# Patient Record
Sex: MALE | Race: WHITE | Employment: FULL TIME | URBAN - METROPOLITAN AREA
[De-identification: names, ages, dates, MRNs, and addresses within clinical notes are randomized per-mention and may not be internally consistent; named-entity substitution may affect disease eponyms.]

---

## 2017-01-17 PROBLEM — E11.9 DIABETES MELLITUS WITHOUT COMPLICATION (HCC): Status: ACTIVE | Noted: 2017-01-17

## 2017-01-17 PROBLEM — M10.00 ACUTE IDIOPATHIC GOUT, UNSPECIFIED SITE: Status: ACTIVE | Noted: 2017-01-17

## 2017-03-28 PROCEDURE — 82043 UR ALBUMIN QUANTITATIVE: CPT | Performed by: FAMILY MEDICINE

## 2017-03-28 PROCEDURE — 82570 ASSAY OF URINE CREATININE: CPT | Performed by: FAMILY MEDICINE

## 2017-06-15 ENCOUNTER — APPOINTMENT (OUTPATIENT)
Dept: CT IMAGING | Facility: HOSPITAL | Age: 62
DRG: 871 | End: 2017-06-15
Attending: EMERGENCY MEDICINE
Payer: COMMERCIAL

## 2017-06-15 ENCOUNTER — APPOINTMENT (OUTPATIENT)
Dept: GENERAL RADIOLOGY | Facility: HOSPITAL | Age: 62
DRG: 871 | End: 2017-06-15
Attending: EMERGENCY MEDICINE
Payer: COMMERCIAL

## 2017-06-15 ENCOUNTER — HOSPITAL ENCOUNTER (INPATIENT)
Facility: HOSPITAL | Age: 62
LOS: 6 days | Discharge: HOME | DRG: 871 | End: 2017-06-21
Attending: EMERGENCY MEDICINE | Admitting: HOSPITALIST
Payer: COMMERCIAL

## 2017-06-15 DIAGNOSIS — N12 PYELONEPHRITIS: ICD-10-CM

## 2017-06-15 DIAGNOSIS — N19 RENAL FAILURE, UNSPECIFIED CHRONICITY: Primary | ICD-10-CM

## 2017-06-15 DIAGNOSIS — N17.9 AKI (ACUTE KIDNEY INJURY) (HCC): ICD-10-CM

## 2017-06-15 PROCEDURE — 74176 CT ABD & PELVIS W/O CONTRAST: CPT | Performed by: EMERGENCY MEDICINE

## 2017-06-15 PROCEDURE — 71010 XR CHEST AP PORTABLE  (CPT=71010): CPT | Performed by: EMERGENCY MEDICINE

## 2017-06-15 PROCEDURE — 70450 CT HEAD/BRAIN W/O DYE: CPT | Performed by: EMERGENCY MEDICINE

## 2017-06-15 RX ORDER — ACETAMINOPHEN 500 MG
1000 TABLET ORAL ONCE
Status: COMPLETED | OUTPATIENT
Start: 2017-06-15 | End: 2017-06-15

## 2017-06-15 RX ORDER — ONDANSETRON 2 MG/ML
4 INJECTION INTRAMUSCULAR; INTRAVENOUS EVERY 6 HOURS PRN
Status: DISCONTINUED | OUTPATIENT
Start: 2017-06-15 | End: 2017-06-21

## 2017-06-15 RX ORDER — HEPARIN SODIUM 5000 [USP'U]/ML
5000 INJECTION, SOLUTION INTRAVENOUS; SUBCUTANEOUS EVERY 8 HOURS SCHEDULED
Status: DISCONTINUED | OUTPATIENT
Start: 2017-06-15 | End: 2017-06-16

## 2017-06-15 RX ORDER — SODIUM CHLORIDE 9 MG/ML
1000 INJECTION, SOLUTION INTRAVENOUS ONCE
Status: DISCONTINUED | OUTPATIENT
Start: 2017-06-15 | End: 2017-06-15

## 2017-06-15 RX ORDER — SODIUM CHLORIDE 9 MG/ML
INJECTION, SOLUTION INTRAVENOUS CONTINUOUS
Status: DISCONTINUED | OUTPATIENT
Start: 2017-06-15 | End: 2017-06-15

## 2017-06-15 RX ORDER — ACETAMINOPHEN 325 MG/1
650 TABLET ORAL EVERY 6 HOURS PRN
Status: DISCONTINUED | OUTPATIENT
Start: 2017-06-15 | End: 2017-06-21

## 2017-06-15 RX ORDER — SODIUM CHLORIDE 9 MG/ML
1000 INJECTION, SOLUTION INTRAVENOUS ONCE
Status: COMPLETED | OUTPATIENT
Start: 2017-06-15 | End: 2017-06-15

## 2017-06-15 RX ORDER — DEXTROSE MONOHYDRATE 25 G/50ML
50 INJECTION, SOLUTION INTRAVENOUS
Status: DISCONTINUED | OUTPATIENT
Start: 2017-06-15 | End: 2017-06-21

## 2017-06-15 RX ORDER — SODIUM CHLORIDE 9 MG/ML
INJECTION, SOLUTION INTRAVENOUS CONTINUOUS
Status: ACTIVE | OUTPATIENT
Start: 2017-06-15 | End: 2017-06-16

## 2017-06-15 RX ORDER — SODIUM CHLORIDE 9 MG/ML
INJECTION, SOLUTION INTRAVENOUS CONTINUOUS
Status: DISCONTINUED | OUTPATIENT
Start: 2017-06-15 | End: 2017-06-16

## 2017-06-15 RX ORDER — ARIPIPRAZOLE 15 MG/1
20 TABLET ORAL ONCE
Status: COMPLETED | OUTPATIENT
Start: 2017-06-15 | End: 2017-06-15

## 2017-06-15 RX ORDER — LEVOTHYROXINE SODIUM 0.07 MG/1
75 TABLET ORAL
Status: DISCONTINUED | OUTPATIENT
Start: 2017-06-16 | End: 2017-06-21

## 2017-06-15 NOTE — ED PROVIDER NOTES
Patient Seen in: [facility] Emergency Department    History   Patient presents with:  Altered Mental Status (neurologic)  Headache (neurologic)  Stroke (neurologic)    Stated Complaint: altered mental status, headaches, visual changes    HPI    This and he was going to be discharged home with an outpatient workup. However, when he stood up he was seeing spotty lites and they decided to send him to the emergency room here.   He was last given ibuprofen a couple hours prior to arrival.  He has not taken tablet (1,000 mg total) by mouth daily. Indomethacin ER 75 MG Oral Cap CR,  Take 1 capsule (75 mg total) by mouth 2 (two) times daily with meals.    Glucose Blood (ONETOUCH ULTRA BLUE) In Vitro Strip,  Checking sugar 1 time a day Diagnosis E11.8   ONETOUC Ht 165.1 cm (5' 5\")  Wt 97.977 kg  BMI 35.94 kg/m2  SpO2 98%        Physical Exam    GENERAL: Awake, alert oriented x3, nontoxic appearing. SKIN: Normal, warm, and dry. HEENT:  Pupils equally round and reactive to light. Conjuctiva clear.   Oropharynx i POC Glucose 161 (*)     All other components within normal limits   CBC W/ DIFFERENTIAL - Abnormal; Notable for the following:     HGB 11.5 (*)     HCT 34.8 (*)     .0 (*)     MCV 78.0 (*)     MCH 25.8 (*)     Lymphocyte Absolute 0.43 (*)     All ot Pro-calcitonin was elevated at 37. Lactic acid was normal at 1.2. Blood cultures are sent ×2 and pending. CT scan of the brain was obtained and negative.   CT scan of the abdomen/pelvis was obtained demonstrated a moderate right perinephric inflammatory

## 2017-06-16 PROCEDURE — 99223 1ST HOSP IP/OBS HIGH 75: CPT | Performed by: INTERNAL MEDICINE

## 2017-06-16 RX ORDER — ALFUZOSIN HYDROCHLORIDE 10 MG/1
10 TABLET, EXTENDED RELEASE ORAL
Status: DISCONTINUED | OUTPATIENT
Start: 2017-06-16 | End: 2017-06-21

## 2017-06-16 RX ORDER — ATORVASTATIN CALCIUM 40 MG/1
40 TABLET, FILM COATED ORAL
Status: DISCONTINUED | OUTPATIENT
Start: 2017-06-16 | End: 2017-06-21

## 2017-06-16 RX ORDER — ESCITALOPRAM OXALATE 10 MG/1
10 TABLET ORAL EVERY MORNING
Status: DISCONTINUED | OUTPATIENT
Start: 2017-06-16 | End: 2017-06-21

## 2017-06-16 RX ORDER — ALLOPURINOL 300 MG/1
300 TABLET ORAL
Status: DISCONTINUED | OUTPATIENT
Start: 2017-06-16 | End: 2017-06-21

## 2017-06-16 RX ORDER — MAGNESIUM OXIDE 400 MG (241.3 MG MAGNESIUM) TABLET
400 TABLET ONCE
Status: COMPLETED | OUTPATIENT
Start: 2017-06-16 | End: 2017-06-16

## 2017-06-16 RX ORDER — ACETAMINOPHEN 325 MG/1
650 TABLET ORAL EVERY 6 HOURS PRN
Status: DISCONTINUED | OUTPATIENT
Start: 2017-06-16 | End: 2017-06-16

## 2017-06-16 RX ORDER — PANTOPRAZOLE SODIUM 20 MG/1
20 TABLET, DELAYED RELEASE ORAL
Status: DISCONTINUED | OUTPATIENT
Start: 2017-06-16 | End: 2017-06-21

## 2017-06-16 RX ORDER — DEXTROSE AND SODIUM CHLORIDE 5; .9 G/100ML; G/100ML
INJECTION, SOLUTION INTRAVENOUS CONTINUOUS
Status: DISCONTINUED | OUTPATIENT
Start: 2017-06-16 | End: 2017-06-18

## 2017-06-16 NOTE — CONSULTS
[facility]  Report of Consultation    Alma Romero Patient Status:  Inpatient    1955 MRN [id number]   [facility] 3NE-A Attending Jesika Suggs MD   Hosp Day # 1 PCP Gopi Sutherland MD     Reason for Consultation: failure   • Diabetes Father    • Heart Disease Father      CAD   • Renal Disease Father    • alcoholism[other] [OTHER] Father    • Eva Dire Mother      Stroke   • Stroke Mother 79     CVA   • Stroke Maternal Grandmother      CVA   • Cancer Mate Systems:  See HPI; A total of 12 systems reviewed and otherwise unremarkable. Physical Exam:  Vital signs: Blood pressure 120/64, pulse 88, temperature 100.2 °F (37.9 °C), temperature source Oral, resp.  rate 20, height 65\", weight 217 lb 12.8 oz, SpO2 NITROGEN (mg/dL)   Date Value   03/28/2017 32*   ----------  CREATININE (mg/dL)   Date Value   06/16/2017 3.87*   06/15/2017 3.34*   03/28/2017 0.96   08/24/2016 0.95   ----------  CREATININE (P) (mg/dL)   Date Value   04/04/2013 0.95   10/01/2012 0.91   0

## 2017-06-16 NOTE — PAYOR COMM NOTE
--------------  ADMISSION REVIEW     Payor: [facility]/HMO/POS/EPO    ED  History   Patient presents with:  Altered Mental Status   Headache   Stroke     Stated Complaint: altered mental status, headaches, visual changes      This is a right- 101.7. He presents here for further evaluation. Past Medical History   Diagnosis Date   • HYPERLIPIDEMIA    • HYPERTENSION    • SLEEP APNEA      Uvuloplasty and mask.     • Type II or unspecified type diabetes mellitus without mention of complication, URINALYSIS WITH CULTURE REFLEX - Abnormal; Notable for the following:     Clarity Urine Cloudy (*)     Blood Urine Moderate (*)     Protein Urine 100  (*)     Leukocyte Esterase Urine Large (*)     WBC Urine >50 (*)     RBC URINE 6-10 (*)     Bacteria Ur CULTURE   URINE CULTURE, ROUTINE      EKG    Rate, intervals and axes as noted on EKG Report. Rate:91Rhythm: Sinus Rhythm  Reading: No acute changes. Nonspecific T-wave abnormality.             ED COURSE     Patient placed on cardiac monitor, continuous p

## 2017-06-16 NOTE — H&P
[facility] hospitalist H+P    PCP;An Cat MD  CC fever  HPI 56 yo male with hx of HTN, HL, AMY, DM2, Depression, kidney stone came to ER for evaluation of fever and altered menetal status.  He has not been feeling well, had back pain, per wife   Spouse Name: N/A    Years of Education: N/A  Number of Children: 1     Occupational History  .         Social History Main Topics   Smoking status: Never Smoker     Smokeless tobacco: Never Used    Alcohol Use: No    Drug Use: N tablet Rfl: 3   Glucose Blood (ONETOUCH ULTRA BLUE) In Vitro Strip Checking sugar 1 time a day Diagnosis E11.8 Disp: 100 strip Rfl: 4   ONETOUCH LANCETS Does not apply Misc Checking sugar once a day diagnosis E11.8 Disp: 100 each Rfl: 4   Blood Glucose Mon status. patient with Sepsis    GNR Sepsis(klebsiella Pneumonia infection in blood stream), pyelonephritis: IVF, IV zosyn, awaiting culture sensitivities.  Patient is diabetic, consult ID    FIFI/ARF IVF, appreciate renal input, suspect due to sepsis    Altere

## 2017-06-16 NOTE — PLAN OF CARE
Diabetes/Glucose Control    • Glucose maintained within prescribed range Progressing        GASTROINTESTINAL - ADULT    • Maintains or returns to baseline bowel function Progressing    • Maintains adequate nutritional intake (undernourished) Progressing

## 2017-06-16 NOTE — PROGRESS NOTES
NURSING ADMISSION NOTE      Patient admitted via Cart  Oriented to room. Safety precautions initiated. Bed in low position. Call light in reach. Pt is aox4. States no pain. Up ad alessandra. Contact+ iso d/t recent diarrhea.   Collection device in toilet

## 2017-06-16 NOTE — CONSULTS
INFECTIOUS DISEASE CONSULT NOTE    Oscarashwini Moose Romero Patient Status:  Inpatient    1955 MRN [id number]   [facility] 3NE-A Attending Lowell Flores MD   Hosp Day # 1 PCP Marilynn Oliva scanned to media tab    OTHER SURGICAL HISTORY  10/20/10    Comment RIGHT SHOULDER SUBACROMIAL DECOMPRESSION WITH ROTATOR CUFF REPAIR     Family History   Problem Relation Age of Onset   • Heart Disorder Father    • Hypertension Father      with kidney ming Intravenous, Q12H    Review of Systems:    Completed. See pertinent positives and negatives in the the HPI. Constitutional: see above  Eyes: Negative for eye drainage and redness.   Ears, nose, mouth, throat: Negative for nasal congestion, sore throat, o 1838  06/16/17   0457   GLU  144*  100*   BUN  57*  61*   CREATSERUM  3.34*  3.87*   CA  9.4  9.0   ALB  2.8*   --    NA  135*  139   K  3.7  3.8   CL  103  107   CO2  21.0*  22.0   ALKPHO  61   --    AST  31   --    ALT  38   --    BILT  0.9   --    TP  7 Abdomen+pelvis(cpt=74176)    6/15/2017  PROCEDURE:  CT ABDOMEN+PELVIS(CPT=74176)  COMPARISON:  None.   INDICATIONS:  altered mental status, headaches, visual changes  TECHNIQUE:  Unenhanced multislice CT scanning was performed from the dome of the diaphragm No bony lesion or fracture. LUNG BASES:  Tiny amount of right pleural fluid along with atelectasis is noted. OTHER:  Negative. 6/15/2017  CONCLUSION:   1.  Tiny amount of right pleural fluid with atelectasis

## 2017-06-17 PROCEDURE — 99233 SBSQ HOSP IP/OBS HIGH 50: CPT | Performed by: INTERNAL MEDICINE

## 2017-06-17 NOTE — PROGRESS NOTES
[facility]  Nephrology Progress Note    Audrey Romero Patient Status:  Inpatient    1955 MRN [id number]   [facility] 3NE-A Attending Erasto Bermudez MD   Hosp Day # 2 PCP Charmaine May MD       SUBJECTIVE:  Feels weak, a 06/16/17   1817  06/16/17 2057 06/17/17   0843   PGLU  74  88  148*  126*  146*       Meds:     Current Facility-Administered Medications:  Alfuzosin HCl ER (UROXATRAL) 24 hr tab 10 mg 10 mg Oral Daily   allopurinol (ZYLOPRIM) tab 300 mg 300 mg Oral Marshall Common

## 2017-06-17 NOTE — PLAN OF CARE
GASTROINTESTINAL - ADULT    • Maintains or returns to baseline bowel function Progressing    • Maintains adequate nutritional intake (undernourished) Progressing        PAIN - ADULT    • Verbalizes/displays adequate comfort level or patient's stated pain g

## 2017-06-17 NOTE — PROGRESS NOTES
[facility] hospitalist daily note  Patient was seen/examined on 6/17/17    S; no chest pain, no headache, no abd pain, no SOB    Medications in EPIC    PE;   06/17/17  1145   BP: 148/72   Pulse: 93   Temp: 99.9 °F (37.7 °C)   Resp: 18     Gen: awake, alert, n

## 2017-06-17 NOTE — PROGRESS NOTES
INFECTIOUS DISEASE PROGRESS NOTE    Gloria Romero Patient Status:  Inpatient    1955 MRN [id number]   [facility] 3NE-A Attending Billy Nunez MD   Hosp Day # 2 PCP Amish Rodriguez MD     Subjective:  Improved temp.  Overall f FIFI due to above and poor po intake- no evidence of obstrcution    3. Nephrolithiasis- passed stone recently    4.  TME due to above- better    PLAN:              -d/c Zosyn    -start ceftriaxone              -repeat Bcx to doc clearance - pending

## 2017-06-18 PROCEDURE — 99232 SBSQ HOSP IP/OBS MODERATE 35: CPT | Performed by: INTERNAL MEDICINE

## 2017-06-18 RX ORDER — HYDRALAZINE HYDROCHLORIDE 50 MG/1
25 TABLET, FILM COATED ORAL EVERY 6 HOURS PRN
Status: DISCONTINUED | OUTPATIENT
Start: 2017-06-18 | End: 2017-06-21

## 2017-06-18 RX ORDER — TRAMADOL HYDROCHLORIDE 50 MG/1
50 TABLET ORAL EVERY 12 HOURS PRN
Status: DISCONTINUED | OUTPATIENT
Start: 2017-06-18 | End: 2017-06-20

## 2017-06-18 RX ORDER — HEPARIN SODIUM 5000 [USP'U]/ML
5000 INJECTION, SOLUTION INTRAVENOUS; SUBCUTANEOUS 2 TIMES DAILY
Status: DISCONTINUED | OUTPATIENT
Start: 2017-06-18 | End: 2017-06-21

## 2017-06-18 RX ORDER — FENOFIBRATE 134 MG/1
134 CAPSULE ORAL
Status: DISCONTINUED | OUTPATIENT
Start: 2017-06-18 | End: 2017-06-19

## 2017-06-18 RX ORDER — SODIUM CHLORIDE 9 MG/ML
INJECTION, SOLUTION INTRAVENOUS CONTINUOUS
Status: DISCONTINUED | OUTPATIENT
Start: 2017-06-18 | End: 2017-06-20

## 2017-06-18 NOTE — PROGRESS NOTES
[facility]  Nephrology Progress Note    Sherrie Romero Patient Status:  Inpatient    1955 MRN [id number]   [facility] 3NE-A Attending Andrea Webb MD   Hosp Day # 3 PCP Anabel Sharma MD       SUBJECTIVE:  Feels better 86  223*       Recent Labs   Lab  06/15/17   1838   ALT  38   AST  31   ALB  2.8*       Recent Labs   Lab  06/16/17   2057  06/17/17   0843  06/17/17   1254  06/17/17   1802  06/17/17   2206   PGLU  126*  146*  130*  139*  173*       Meds:     Current Faci

## 2017-06-18 NOTE — PROGRESS NOTES
[facility] hospitalist daily note  Seen/examined on 6/18/17    S; per patient he is still having some headache but overall starts to feel better  Able to move extremities.  No chest pain, no SOB    Medications in EPIC    PE;   06/18/17  1021   BP: 144/70   Pul

## 2017-06-19 ENCOUNTER — APPOINTMENT (OUTPATIENT)
Dept: CV DIAGNOSTICS | Facility: HOSPITAL | Age: 62
DRG: 871 | End: 2017-06-19
Attending: INTERNAL MEDICINE
Payer: COMMERCIAL

## 2017-06-19 PROCEDURE — 99232 SBSQ HOSP IP/OBS MODERATE 35: CPT | Performed by: INTERNAL MEDICINE

## 2017-06-19 PROCEDURE — 93306 TTE W/DOPPLER COMPLETE: CPT | Performed by: INTERNAL MEDICINE

## 2017-06-19 RX ORDER — METOPROLOL SUCCINATE 25 MG/1
25 TABLET, EXTENDED RELEASE ORAL
Status: DISCONTINUED | OUTPATIENT
Start: 2017-06-19 | End: 2017-06-21

## 2017-06-19 RX ORDER — MAGNESIUM OXIDE 400 MG (241.3 MG MAGNESIUM) TABLET
200 TABLET ONCE
Status: COMPLETED | OUTPATIENT
Start: 2017-06-19 | End: 2017-06-19

## 2017-06-19 NOTE — CONSULTS
[facility] [facility] [address] [facility]  Report of Consultation    Johnna Kandice Lunabib Patient Status:  Inpatient    1955 MRN [id number]   [facility] 3NE-A Attending Emelyn Jaramillo,*   Hosp Day # 4 PCP Cornelio José MD     Reason ELECTROCARDIOGRAM, COMPLETE  10/22/13    Comment scanned to media tab    OTHER SURGICAL HISTORY  10/20/10    Comment RIGHT SHOULDER SUBACROMIAL DECOMPRESSION WITH ROTATOR CUFF REPAIR      Family History   Problem Relation Age of Onset   • Heart Disorder Fa tablet Rfl: 3   GLYBURIDE 1.25 MG Oral Tab TAKE 1 TABLET TWICE A DAY BEFORE MEALS Disp: 180 tablet Rfl: 1   FENOFIBRATE 145 MG Oral Tab TAKE 1 TABLET DAILY Disp: 90 tablet Rfl: 3   LEVOTHYROXINE SODIUM 75 MCG Oral Tab TAKE 1 TABLET DAILY Disp: 90 tablet Rf oz (98.204 kg)  05/17/17 : 222 lb 3.2 oz (100.789 kg)          General: Well developed, well nourished male. Pt is in no acute distress. HEENT:   Normocephalic. Atraumatic. Eyes with no scleral icterus. Neck: Supple. No JVD.   Carotids 2+ and equal in of NEDA  3. HL  4.  DM II  5. Hypothyroidism  6. AMY  7. Pylonephritis with septicemia  8. NEDA  9. Abn ECG    Plan:  1. Replete Mg  2. Add Toprol XL 25mg PO qAM  3. CPAP  4. Check TFTs  5.  Echo pending  6. ECG   -F/U QT  7.   Tele monitor    Shin Jurado

## 2017-06-19 NOTE — PROGRESS NOTES
[address] [place] [address]  TEL: [phone]  FAX: [phone] [address] [facility] Patient Status:  Inpatient    1955 MRN [id number]   [facility] 3NE-A Attending Karma Mackay Day # 4 JAN Dow resolution of fevers  Plan will be to transition to po abx once ready for d/c. Will treat for 14 days total  Case and plan d/w pt and family.     Irma Hi

## 2017-06-19 NOTE — PROGRESS NOTES
Pharmacy Note: Renal dose adjustment for Tramadol (Ultram)    Philmore Sandifer has been prescribed Tramadol (Ultram)  50 mg orally every 6 hours as needed for pain. Estimated Creatinine Clearance: 20.4 mL/min (based on Cr of 3.18).     His calculated creati

## 2017-06-19 NOTE — PAYOR COMM NOTE
--------------  CONTINUED STAY REVIEW    Payor: [employer] [facility]/HMO/POS/EPO  Subscriber #:  [id number]  Authorization Number:  [id number]     Admit date: 6/15/2017  6:06 PM       Admitting Physician: Kilo Bolanos MD  Attending Physician:   Liam             -MIKE ARB in setting of NEDA  3.  HL  4.  DM II  5.  Hypothyroidism  6.  AMY  7.  Pylonephritis with septicemia  8.  NEDA  9.  Abn ECG    Plan:  1.  Replete Mg  2.  Add Toprol XL 25mg PO qAM  3.  CPAP  4.  Check TFTs  5.  Echo pending  6.  ECG

## 2017-06-19 NOTE — PROGRESS NOTES
[facility]  Nephrology Progress Note    [address] [facility] Attending:  Makenna Kerns,*       Assessment and Plan:    1) FIFI- due to volume depletion in setting of klebsiella bacteremia / UTI + ARB / NSAID effect -> ATN, now improving with good U PGLU 129 06/19/2017       Imaging: All imaging studies reviewed.     Meds:     Current Facility-Administered Medications:  Metoprolol Succinate ER (Toprol XL) 24 hr tab 25 mg 25 mg Oral Daily Beta Blocker   magnesium oxide (MAG-OX) tab 200 mg 200 mg Oral

## 2017-06-19 NOTE — PROGRESS NOTES
[facility] Hospitalist Progress Note                                                                   [address]  4/27/1955    CC: FU sepsis    Interval History:  - Feels better, energy up a l CREATSERUM  4.23*  3.18*  2.37*   CA  9.0  9.2  9.5   NA  140  141  144   K  4.1  3.8  4.0   CL  110  109  109   CO2  23.0  23.0  28.0           ROS: no change to ROS from my documentation yesterday, except as otherwise noted in the Interval History abov

## 2017-06-20 PROCEDURE — 99232 SBSQ HOSP IP/OBS MODERATE 35: CPT | Performed by: INTERNAL MEDICINE

## 2017-06-20 RX ORDER — TRAMADOL HYDROCHLORIDE 50 MG/1
50 TABLET ORAL EVERY 6 HOURS PRN
Status: DISCONTINUED | OUTPATIENT
Start: 2017-06-20 | End: 2017-06-21

## 2017-06-20 RX ORDER — AMLODIPINE BESYLATE 5 MG/1
10 TABLET ORAL DAILY
Status: DISCONTINUED | OUTPATIENT
Start: 2017-06-20 | End: 2017-06-21

## 2017-06-20 RX ORDER — MAGNESIUM OXIDE 400 MG (241.3 MG MAGNESIUM) TABLET
200 TABLET ONCE
Status: COMPLETED | OUTPATIENT
Start: 2017-06-20 | End: 2017-06-20

## 2017-06-20 NOTE — PROGRESS NOTES
[facility] Hospitalist Progress Note                                                                   [address]  4/27/1955    CC: FU sepsis    Interval History:  - Feeling better, labs improv 31.9  31.8   RDW  15.6  15.7  15.6   NEPRELIM  4.49  5.10  5.11   WBC  5.8  7.3  7.5   PLT  172.0  229.0  276.0     Recent Labs   Lab  06/18/17   0554  06/19/17   0417  06/20/17   0451   GLU  223*  131*  144*   BUN  50*  43*  35*   CREATSERUM  3.18*  2.37*

## 2017-06-20 NOTE — PROGRESS NOTES
[facility]  Progress Note    Davidson Romero Patient Status:  Inpatient    1955 MRN [id number]   [facility] 3NE-A Attending Gilford Guy,*   Hosp Day # 5 PCP Keaton Hines MD     Subjective:   Ksenia Mireles acute distress. HEENT:  Normocephalic. Atraumatic. No icterus. Neck:  There is no jugular venous distention. Cardiovascular:  Cardiovascular examination demonstrates a regular rate and rhythm. There is normal S1, S2. There is no S3 or S4.   There ar motion abnormalities. Features are consistent with a     pseudonormal left ventricular filling pattern, with concomitant abnormal     relaxation and increased filling pressure - grade 2 diastolic     dysfunction. 2. Mitral valve:  There was mild regurgitat

## 2017-06-20 NOTE — CONSULTS
[facility] Pharmacy Note:  Renal Dose Adjustment for Tramadol Elif Villarreal    Vijay Romero was on Tramadol (ULTRAM) 50 mg orally every 12 hours as needed for pain. Estimated Creatinine Clearance: 37.3 mL/min (based on Cr of 1.74).     His calculated creatinine cl

## 2017-06-20 NOTE — PROGRESS NOTES
[facility]  Nephrology Progress Note    [address] [facility] Attending:  Jaxson Brumfield,*       Assessment and Plan:    1) FIFI- due to volume depletion in setting of klebsiella bacteremia / UTI + ARB / NSAID effect -> ATN, now improving with good U 06/20/2017   K 3.7 06/20/2017    06/20/2017   CO2 24.0 06/20/2017    06/20/2017   CA 9.3 06/20/2017   T4F 1.3 06/20/2017   TSH 4.100 06/20/2017   MG 1.5 06/20/2017   PHOS 1.8 06/20/2017   PGLU 149 06/20/2017       Imaging:   All imaging studies

## 2017-06-20 NOTE — PROGRESS NOTES
[address] [facility]  TEL: [phone]  FAX: [phone] 5[address] [facility] Patient Status:  Inpatient    1955 MRN [id number]   [facility] 3NE-A Attending Thompson, [facility] [address] Day # 5 PCP Anh Mackay intake- no evidence of obstruction, cr is better    3. Nephrolithiasis- passed stone recently    4.  TME due to above- resolved    PLAN:  -cont CTX while here  -follow repeat Bcx to doc clearance  -follow temps to doc resolution of fevers  Plan will be to t

## 2017-06-21 VITALS
DIASTOLIC BLOOD PRESSURE: 67 MMHG | HEART RATE: 62 BPM | BODY MASS INDEX: 36.29 KG/M2 | SYSTOLIC BLOOD PRESSURE: 147 MMHG | HEIGHT: 65 IN | OXYGEN SATURATION: 98 % | RESPIRATION RATE: 18 BRPM | WEIGHT: 217.81 LBS | TEMPERATURE: 99 F

## 2017-06-21 PROCEDURE — 99232 SBSQ HOSP IP/OBS MODERATE 35: CPT | Performed by: INTERNAL MEDICINE

## 2017-06-21 RX ORDER — CEFADROXIL 500 MG/1
500 CAPSULE ORAL 2 TIMES DAILY
Qty: 20 CAPSULE | Refills: 0 | Status: SHIPPED | OUTPATIENT
Start: 2017-06-21 | End: 2017-07-01

## 2017-06-21 RX ORDER — CEFADROXIL 500 MG/1
500 CAPSULE ORAL 2 TIMES DAILY
Qty: 20 CAPSULE | Refills: 0 | Status: SHIPPED | OUTPATIENT
Start: 2017-06-21 | End: 2017-06-21

## 2017-06-21 RX ORDER — METOPROLOL SUCCINATE 25 MG/1
25 TABLET, EXTENDED RELEASE ORAL
Qty: 30 TABLET | Refills: 3 | Status: SHIPPED | OUTPATIENT
Start: 2017-06-21 | End: 2017-07-27

## 2017-06-21 RX ORDER — MAGNESIUM OXIDE 400 MG (241.3 MG MAGNESIUM) TABLET
400 TABLET ONCE
Status: COMPLETED | OUTPATIENT
Start: 2017-06-21 | End: 2017-06-21

## 2017-06-21 NOTE — CM/SW NOTE
06/21/17 1100   CM/SW Screening   Referral Source Social Work (self-referral); Nurse   Information Source Chart review;Nursing rounds   Patient's Mental Status Alert;Oriented   Patient lives with Spouse   Patient Status Prior to Admission   Independent w

## 2017-06-21 NOTE — PHYSICAL THERAPY NOTE
PHYSICAL THERAPY QUICK EVALUATION - INPATIENT    Room Number: 2875/2414-O  Evaluation Date: 6/21/2017  Presenting Problem: Sepsis  Physician Order: PT Eval and Treat    Problem List  Principal Problem:    Renal failure, unspecified chronicity  Active Probl RESTRICTION  Weight Bearing Restriction: None                PAIN ASSESSMENT  Ratin         RANGE OF MOTION AND STRENGTH ASSESSMENT  Upper extremity ROM and strength are within functional limits     Lower extremity ROM is within functional limits     L upright on couch. The pt completed sit>stand with arms crossed over chest independently. Pt ambulated 200 feet with independence. Pt ascended/descended 12 stairs with a step-over-step stair pattern with use of a handrail with Mod I.     Patient End of Se

## 2017-06-21 NOTE — DISCHARGE SUMMARY
General Medicine Discharge Summary     Patient ID:  Allyson Heart year old  4/27/1955    Admit date: 6/15/2017    Discharge date and time:  6/21/17    Attending Physician: Andie Ferris deficits  -Resolved    # DM2 SSI- will hold sulfonylurea on DC due to renal function, continue metformin, BS has been OK in house    # HTN, HL no losartan due to ARF. BP is mildly elevated but improved today.  OK for DC- add back ARB as outpatient when OK w discuss with provider.       STOP taking these medications    GLYBURIDE 1.25 MG Oral Tab    FENOFIBRATE 145 MG Oral Tab    LOSARTAN 100 MG Oral Tab    ValACYclovir HCl (VALTREX) 1 G Oral Tab    Indomethacin ER 75 MG Oral Cap CR          Activity: activity a

## 2017-06-21 NOTE — PROGRESS NOTES
[facility]  Nephrology Progress Note    [address] [facility] Attending:  Estrellita Lopez,*       Assessment and Plan:    1) FIFI- due to volume depletion in setting of klebsiella bacteremia / UTI + ARB / NSAID effect -> ATN, now improving with good U 06/21/2017   .0 06/21/2017   CREATSERUM 1.75 06/21/2017   BUN 39 06/21/2017    06/21/2017   K 4.1 06/21/2017    06/21/2017   CO2 27.0 06/21/2017    06/21/2017   CA 9.6 06/21/2017   MG 1.8 06/21/2017   PHOS 2.5 06/21/2017   PGLU 15

## 2017-06-21 NOTE — PROGRESS NOTES
[address] [facility]  TEL: [phone]  FAX: [phone] 5266 [facility] Patient Status:  Inpatient    1955 MRN [id number]   [facility] 3NE-A Attending Lefty Mackay [facility] Day # 6 PCP Tarun Kenney obstruction, cr is better    3. Nephrolithiasis- passed stone recently    4. TME due to above- resolved    PLAN:  -ok for home today on po abx. Will use cefadroxil and treat for 14 days total (10 more days)  Case and plan d/w pt.  He will f/u with his PCP,

## 2017-06-21 NOTE — PROGRESS NOTES
NURSING DISCHARGE NOTE    Discharged Home via Wheelchair. Accompanied by Spouse  Belongings Taken by patient/family.     All discharge instructions reviewed and pt voiced understanding of instructions

## 2017-06-25 NOTE — PAYOR COMM NOTE
--------------  CONTINUED STAY REVIEW    Payor: [address] #:  [id number]  Authorization Number:  [id number]     Admit date: 6/15/2017  6:06 PM       Admitting Physician: Cary Norman MD  Primary Care Physician:

## 2017-06-29 ENCOUNTER — HOSPITAL ENCOUNTER (OUTPATIENT)
Dept: LAB | Facility: HOSPITAL | Age: 62
Discharge: HOME | End: 2017-06-29
Attending: FAMILY MEDICINE
Payer: COMMERCIAL

## 2017-06-29 DIAGNOSIS — E87.5 HYPERKALEMIA: ICD-10-CM

## 2017-06-29 PROCEDURE — 80048 BASIC METABOLIC PNL TOTAL CA: CPT

## 2017-06-29 PROCEDURE — 36415 COLL VENOUS BLD VENIPUNCTURE: CPT

## 2017-07-05 PROCEDURE — 87186 SC STD MICRODIL/AGAR DIL: CPT | Performed by: FAMILY MEDICINE

## 2017-07-05 PROCEDURE — 87077 CULTURE AEROBIC IDENTIFY: CPT | Performed by: FAMILY MEDICINE

## 2017-07-05 PROCEDURE — 87086 URINE CULTURE/COLONY COUNT: CPT | Performed by: FAMILY MEDICINE

## 2017-07-24 PROCEDURE — 87086 URINE CULTURE/COLONY COUNT: CPT | Performed by: FAMILY MEDICINE

## 2017-08-09 PROBLEM — R04.0 EPISTAXIS: Status: ACTIVE | Noted: 2017-08-09

## 2017-12-03 ENCOUNTER — HOSPITAL ENCOUNTER (EMERGENCY)
Facility: HOSPITAL | Age: 62
Discharge: HOME | End: 2017-12-03
Attending: EMERGENCY MEDICINE
Payer: COMMERCIAL

## 2017-12-03 VITALS
HEART RATE: 50 BPM | TEMPERATURE: 98 F | DIASTOLIC BLOOD PRESSURE: 73 MMHG | BODY MASS INDEX: 34.16 KG/M2 | OXYGEN SATURATION: 96 % | RESPIRATION RATE: 18 BRPM | WEIGHT: 205 LBS | SYSTOLIC BLOOD PRESSURE: 160 MMHG | HEIGHT: 65 IN

## 2017-12-03 DIAGNOSIS — H53.9 VISUAL DISTURBANCE: Primary | ICD-10-CM

## 2017-12-03 PROCEDURE — 99283 EMERGENCY DEPT VISIT LOW MDM: CPT

## 2017-12-03 RX ORDER — TETRACAINE HYDROCHLORIDE 5 MG/ML
1-2 SOLUTION OPHTHALMIC ONCE
Status: COMPLETED | OUTPATIENT
Start: 2017-12-03 | End: 2017-12-03

## 2017-12-04 PROBLEM — H25.813 COMBINED FORMS OF AGE-RELATED CATARACT OF BOTH EYES: Status: ACTIVE | Noted: 2017-12-04

## 2017-12-04 PROBLEM — H33.311 RETINAL TEAR OF RIGHT EYE: Status: ACTIVE | Noted: 2017-12-04

## 2017-12-04 PROBLEM — E11.9 DIABETES MELLITUS TYPE 2 WITHOUT RETINOPATHY (HCC): Status: ACTIVE | Noted: 2017-12-04

## 2017-12-04 PROBLEM — H43.811 PVD (POSTERIOR VITREOUS DETACHMENT), RIGHT: Status: ACTIVE | Noted: 2017-12-04

## 2017-12-04 PROBLEM — H43.11 VITREOUS HEMORRHAGE OF RIGHT EYE (HCC): Status: ACTIVE | Noted: 2017-12-04

## 2017-12-04 NOTE — ED PROVIDER NOTES
Patient Seen in: [facility] Emergency Department    History   Patient presents with: Eye Visual Problem (opthalmic)    Stated Complaint: rt eye flashes of light     HPI    26-year-old male complaining of flashing lights in the right eye.   Patient s Review of Systems    Positive for stated complaint: rt eye flashes of light   Other systems are as noted in HPI. Constitutional and vital signs reviewed. All other systems reviewed and negative except as noted above.     Physical Exam   ED Tri

## 2017-12-04 NOTE — ED INITIAL ASSESSMENT (HPI)
Patient reports with visual changes for 3 days. + flashes of light for 2 days, + white ring that he sees in the dark.  + black swirling line in right eye since 6pm today. Denies headache, no hx of the same.

## 2018-06-19 PROCEDURE — 82043 UR ALBUMIN QUANTITATIVE: CPT | Performed by: FAMILY MEDICINE

## 2018-06-19 PROCEDURE — 82570 ASSAY OF URINE CREATININE: CPT | Performed by: FAMILY MEDICINE

## 2018-06-30 PROBLEM — I48.0 PAROXYSMAL ATRIAL FIBRILLATION (HCC): Status: ACTIVE | Noted: 2018-06-30

## 2018-09-10 VITALS — BODY MASS INDEX: 36.65 KG/M2 | HEIGHT: 65 IN | WEIGHT: 220 LBS

## 2018-09-11 ENCOUNTER — APPOINTMENT (OUTPATIENT)
Dept: CV DIAGNOSTICS | Facility: HOSPITAL | Age: 63
End: 2018-09-11
Attending: INTERNAL MEDICINE
Payer: COMMERCIAL

## 2018-09-11 ENCOUNTER — HOSPITAL ENCOUNTER (OUTPATIENT)
Dept: INTERVENTIONAL RADIOLOGY/VASCULAR | Facility: HOSPITAL | Age: 63
Discharge: HOME | End: 2018-09-11
Attending: INTERNAL MEDICINE
Payer: COMMERCIAL

## 2018-09-28 NOTE — IMAGING NOTE
Left a voicemail at patient's home number with instructions to eat breakfast, drink plenty of water, hold caffeine(including decaf and chocolate) x 12 hours, take all medications (including beta blockers), wear comfortable clothes free from metal, arrive a

## 2018-10-02 ENCOUNTER — HOSPITAL ENCOUNTER (OUTPATIENT)
Dept: CT IMAGING | Facility: HOSPITAL | Age: 63
Discharge: HOME | End: 2018-10-02
Attending: INTERNAL MEDICINE
Payer: COMMERCIAL

## 2019-03-04 ENCOUNTER — HOSPITAL ENCOUNTER (EMERGENCY)
Facility: HOSPITAL | Age: 64
Discharge: HOME | End: 2019-03-04
Attending: EMERGENCY MEDICINE
Payer: COMMERCIAL

## 2019-03-04 VITALS
HEART RATE: 56 BPM | WEIGHT: 215 LBS | HEIGHT: 65 IN | DIASTOLIC BLOOD PRESSURE: 81 MMHG | RESPIRATION RATE: 18 BRPM | SYSTOLIC BLOOD PRESSURE: 171 MMHG | OXYGEN SATURATION: 98 % | TEMPERATURE: 97 F | BODY MASS INDEX: 35.82 KG/M2

## 2019-03-04 DIAGNOSIS — H53.9 VISION CHANGES: Primary | ICD-10-CM

## 2019-03-04 PROCEDURE — 99283 EMERGENCY DEPT VISIT LOW MDM: CPT

## 2019-03-04 PROCEDURE — 99282 EMERGENCY DEPT VISIT SF MDM: CPT

## 2019-03-05 NOTE — ED PROVIDER NOTES
Patient Seen in: [facility] Emergency Department    History   Patient presents with: Eye Visual Problem (opthalmic)    Stated Complaint: 30min onset of wavy/floater right eye    HPI    Patient presents with vision change.   The patient states that a Laterality Date   • ELECTROCARDIOGRAM, COMPLETE  10/22/13    scanned to media tab   • OTHER SURGICAL HISTORY  10/20/10    RIGHT SHOULDER SUBACROMIAL DECOMPRESSION WITH ROTATOR CUFF REPAIR   • REMOVAL GALLBLADDER     • TONSILLECTOMY             Social Histo patient is comfortable with the plan.     Disposition and Plan     Clinical Impression:  Vision changes  (primary encounter diagnosis)    Disposition:  Discharge  3/4/2019  8:00 pm    Follow-up:  Jane Dobson MD  [address] [place] [address]

## 2019-03-05 NOTE — ED INITIAL ASSESSMENT (HPI)
Patient reports symptoms to right eye since 530 pm today, feels similar to when he had torn retina 1 year ago. Reports looks like \"wave of blood/black moving across eye\", started on right side and moving across to the front of his visual field.  Admits to

## 2019-04-11 PROBLEM — I48.0 PAROXYSMAL A-FIB (HCC): Status: ACTIVE | Noted: 2018-06-30

## 2019-04-11 PROCEDURE — 86003 ALLG SPEC IGE CRUDE XTRC EA: CPT | Performed by: FAMILY MEDICINE

## 2019-07-22 PROBLEM — N19 RENAL FAILURE: Status: RESOLVED | Noted: 2017-06-15 | Resolved: 2019-07-22

## 2019-07-22 PROBLEM — N19 RENAL FAILURE, UNSPECIFIED CHRONICITY: Status: RESOLVED | Noted: 2017-06-15 | Resolved: 2019-07-22

## 2020-01-09 NOTE — ED INITIAL ASSESSMENT (HPI)
PT TO ER C/O INTERMITTENT CONFUSION, FATIGUE, FEVER, AND HEADACHES SINCE Tuesday. TODAY PT HAD VISUAL CHANGES AT HIS PCP OFFICE. TAMX 101.7. normal...

## 2020-01-21 PROBLEM — M48.061 FORAMINAL STENOSIS OF LUMBAR REGION: Status: ACTIVE | Noted: 2020-01-21

## 2020-01-21 PROBLEM — M54.16 RIGHT LUMBAR RADICULOPATHY: Status: ACTIVE | Noted: 2020-01-21

## 2020-01-29 PROBLEM — G47.33 OBSTRUCTIVE SLEEP APNEA: Status: ACTIVE | Noted: 2020-01-29

## 2021-08-03 PROBLEM — E11.9 DIABETES MELLITUS WITHOUT COMPLICATION (HCC): Status: RESOLVED | Noted: 2017-01-17 | Resolved: 2021-08-03

## 2022-04-04 PROBLEM — E66.01 OBESITY, MORBID (HCC): Status: ACTIVE | Noted: 2022-04-04

## 2023-12-26 ENCOUNTER — HOSPITAL ENCOUNTER (EMERGENCY)
Age: 68
Discharge: HOME | End: 2023-12-26
Payer: MEDICARE

## 2023-12-26 VITALS
WEIGHT: 215 LBS | RESPIRATION RATE: 18 BRPM | OXYGEN SATURATION: 97 % | HEIGHT: 65 IN | HEART RATE: 66 BPM | SYSTOLIC BLOOD PRESSURE: 155 MMHG | TEMPERATURE: 98 F | DIASTOLIC BLOOD PRESSURE: 80 MMHG | BODY MASS INDEX: 35.82 KG/M2

## 2023-12-26 DIAGNOSIS — R04.0 EPISTAXIS: Primary | ICD-10-CM

## 2023-12-26 LAB
APTT PPP: 28.7 SECONDS (ref 23.3–35.6)
BASOPHILS # BLD AUTO: 0.07 X10(3) UL (ref 0–0.2)
BASOPHILS NFR BLD AUTO: 0.9 %
EOSINOPHIL # BLD AUTO: 0.26 X10(3) UL (ref 0–0.7)
EOSINOPHIL NFR BLD AUTO: 3.4 %
ERYTHROCYTE [DISTWIDTH] IN BLOOD BY AUTOMATED COUNT: 14.6 %
HCT VFR BLD AUTO: 35.7 %
HGB BLD-MCNC: 11.8 G/DL
IMM GRANULOCYTES # BLD AUTO: 0.03 X10(3) UL (ref 0–1)
IMM GRANULOCYTES NFR BLD: 0.4 %
INR BLD: 0.95 (ref 0.8–1.2)
LYMPHOCYTES # BLD AUTO: 2.14 X10(3) UL (ref 1–4)
LYMPHOCYTES NFR BLD AUTO: 28 %
MCH RBC QN AUTO: 27.4 PG (ref 26–34)
MCHC RBC AUTO-ENTMCNC: 33.1 G/DL (ref 31–37)
MCV RBC AUTO: 83 FL
MONOCYTES # BLD AUTO: 0.61 X10(3) UL (ref 0.1–1)
MONOCYTES NFR BLD AUTO: 8 %
NEUTROPHILS # BLD AUTO: 4.54 X10 (3) UL (ref 1.5–7.7)
NEUTROPHILS # BLD AUTO: 4.54 X10(3) UL (ref 1.5–7.7)
NEUTROPHILS NFR BLD AUTO: 59.3 %
PLATELET # BLD AUTO: 225 10(3)UL (ref 150–450)
PROTHROMBIN TIME: 12.7 SECONDS (ref 11.6–14.8)
RBC # BLD AUTO: 4.3 X10(6)UL
WBC # BLD AUTO: 7.7 X10(3) UL (ref 4–11)

## 2023-12-26 PROCEDURE — 30905 CONTROL OF NOSEBLEED: CPT

## 2023-12-26 PROCEDURE — 99284 EMERGENCY DEPT VISIT MOD MDM: CPT

## 2023-12-26 PROCEDURE — 85610 PROTHROMBIN TIME: CPT | Performed by: NURSE PRACTITIONER

## 2023-12-26 PROCEDURE — 36415 COLL VENOUS BLD VENIPUNCTURE: CPT

## 2023-12-26 PROCEDURE — 85025 COMPLETE CBC W/AUTO DIFF WBC: CPT | Performed by: NURSE PRACTITIONER

## 2023-12-26 PROCEDURE — 85730 THROMBOPLASTIN TIME PARTIAL: CPT | Performed by: NURSE PRACTITIONER

## 2023-12-26 RX ORDER — ORAL SEMAGLUTIDE 14 MG/1
14 TABLET ORAL DAILY
COMMUNITY

## 2023-12-26 RX ORDER — HYDROCHLOROTHIAZIDE 12.5 MG/1
12.5 CAPSULE, GELATIN COATED ORAL DAILY
COMMUNITY

## 2023-12-26 RX ORDER — AMOXICILLIN 500 MG/1
500 TABLET, FILM COATED ORAL 3 TIMES DAILY
Qty: 15 TABLET | Refills: 0 | Status: SHIPPED | OUTPATIENT
Start: 2023-12-26 | End: 2023-12-31

## 2023-12-27 NOTE — ED INITIAL ASSESSMENT (HPI)
Patient here with frequent nosebleed to left side of nose. States he had been having it weekly over the past few weeks. Today several episodes. On eliquis. No bleeding at this time. Denies pain, lightheadedness, shortness of breath.

## 2023-12-27 NOTE — DISCHARGE INSTRUCTIONS
Use a humidifier in same room. Follow-up with the ENT for your frequent nosebleeds. Leave the device we placed inside your left nare until seen by the ENT. This device should be removed in about 3 days. Take the antibiotics as prescribed. Use an over-the-counter probiotic daily while on the antibiotics.

## 2024-02-19 RX ORDER — HYDROCODONE BITARTRATE AND ACETAMINOPHEN 5; 325 MG/1; MG/1
1 TABLET ORAL EVERY 6 HOURS PRN
COMMUNITY
End: 2024-02-19

## 2024-02-19 RX ORDER — CYCLOBENZAPRINE HCL 10 MG
10 TABLET ORAL NIGHTLY PRN
COMMUNITY
End: 2024-02-19

## 2024-02-19 RX ORDER — SPIRONOLACTONE 25 MG/1
25 TABLET ORAL DAILY
COMMUNITY

## 2024-02-19 NOTE — PAT NURSING NOTE
PreOp Instructions for renal angiogram           Date of Procedure: 02/20/24. Check in at 2:00 pm     Diet Instructions: Do not eat or drink anything for 8 hours before the procedure. No food or water after 7:00 am     Medications: Medications you are allowed to take can be taken with a sip of water the morning of your procedure. Hold vitamins/supplements and spironlactone     Do not take the following Blood Thinner(s): CONTINUE TO HOLD ELIQUIS      Diabetic Instructions: Metformin needs to be held 24 hours prior to procedure, your last dose should be the morning dose the day before procedure, Do not take morning dose of your diabetic medications    Sleep Apnea: If you have sleep apnea, please bring your mask and tubing     Skin Prep: Shower with antibacterial soap using a clean washcloth, prior to procedure          Driving After Procedure: If sedation is given, you WILL NOT be able to drive home. You will need a responsible adult  to drive you home.     Discharge Teaching: Your nurse will give you specific instructions before discharge, Most people can resume normal activities in 2-3 days, Any questions, please call the physician's office        [employer] parking is available starting at 6 am or park in the [facility] parking garage at [facility]. Check in at the [facility] reception desk. Our  will be there to check you in for your procedure. Please bring your insurance cards and ID with you.

## 2024-02-20 ENCOUNTER — HOSPITAL ENCOUNTER (OUTPATIENT)
Dept: INTERVENTIONAL RADIOLOGY/VASCULAR | Facility: HOSPITAL | Age: 69
Discharge: HOME | End: 2024-02-20
Attending: INTERNAL MEDICINE | Admitting: INTERNAL MEDICINE
Payer: MEDICARE

## 2024-02-20 ENCOUNTER — HOSPITAL ENCOUNTER (OUTPATIENT)
Dept: INTERVENTIONAL RADIOLOGY/VASCULAR | Facility: HOSPITAL | Age: 69
Discharge: HOME | End: 2024-02-20
Attending: INTERNAL MEDICINE
Payer: MEDICARE

## 2024-02-20 VITALS
OXYGEN SATURATION: 99 % | TEMPERATURE: 98 F | BODY MASS INDEX: 34.99 KG/M2 | HEIGHT: 65 IN | SYSTOLIC BLOOD PRESSURE: 154 MMHG | RESPIRATION RATE: 21 BRPM | HEART RATE: 56 BPM | DIASTOLIC BLOOD PRESSURE: 67 MMHG | WEIGHT: 210 LBS

## 2024-02-20 DIAGNOSIS — E78.00 PURE HYPERCHOLESTEROLEMIA: ICD-10-CM

## 2024-02-20 DIAGNOSIS — I70.1 RENAL ARTERY STENOSIS (HCC): ICD-10-CM

## 2024-02-20 LAB — GLUCOSE BLD-MCNC: 131 MG/DL (ref 70–99)

## 2024-02-20 PROCEDURE — 99153 MOD SED SAME PHYS/QHP EA: CPT | Performed by: INTERNAL MEDICINE

## 2024-02-20 PROCEDURE — 36252 INS CATH REN ART 1ST BILAT: CPT | Performed by: INTERNAL MEDICINE

## 2024-02-20 PROCEDURE — 99152 MOD SED SAME PHYS/QHP 5/>YRS: CPT | Performed by: INTERNAL MEDICINE

## 2024-02-20 PROCEDURE — B4001ZZ PLAIN RADIOGRAPHY OF ABDOMINAL AORTA USING LOW OSMOLAR CONTRAST: ICD-10-PCS | Performed by: INTERNAL MEDICINE

## 2024-02-20 PROCEDURE — 82962 GLUCOSE BLOOD TEST: CPT

## 2024-02-20 RX ORDER — IODIXANOL 320 MG/ML
100 INJECTION, SOLUTION INTRAVASCULAR
Status: COMPLETED | OUTPATIENT
Start: 2024-02-20 | End: 2024-02-20

## 2024-02-20 RX ORDER — LIDOCAINE HYDROCHLORIDE 10 MG/ML
INJECTION, SOLUTION EPIDURAL; INFILTRATION; INTRACAUDAL; PERINEURAL
Status: COMPLETED
Start: 2024-02-20 | End: 2024-02-20

## 2024-02-20 RX ORDER — MIDAZOLAM HYDROCHLORIDE 1 MG/ML
INJECTION INTRAMUSCULAR; INTRAVENOUS
Status: COMPLETED
Start: 2024-02-20 | End: 2024-02-20

## 2024-02-20 RX ORDER — SODIUM CHLORIDE 9 MG/ML
INJECTION, SOLUTION INTRAVENOUS CONTINUOUS
Status: DISCONTINUED | OUTPATIENT
Start: 2024-02-20 | End: 2024-02-20

## 2024-02-20 RX ORDER — HEPARIN SODIUM 5000 [USP'U]/ML
INJECTION, SOLUTION INTRAVENOUS; SUBCUTANEOUS
Status: COMPLETED
Start: 2024-02-20 | End: 2024-02-20

## 2024-02-20 RX ADMIN — IODIXANOL 175 ML: 320 INJECTION, SOLUTION INTRAVASCULAR at 16:55:00

## 2024-02-20 NOTE — PROCEDURES
[facility]       John Romero Location: Cath Lab    [facility] [id number] MRN [id number]   Admission Date 2/20/2024 Procedure Date 2/20/2024   Attending Physician Dwayne Isaac MD Procedure Physician Dwayne Isaac MD       PERIPHERAL ANGIOGRAHY REPORT    PREOPERATIVE DIAGNOSIS:  multi-drug resistant HTN, abnormal renal artery doppler suggestive of hemodynamically significant left renal artery stenosis  POSTOPERATIVE DIAGNOSIS:  non-obstructive/hemodynamically insignificant renal artery stenosis.  PROCEDURE PERFORMED:  ultrasound guided right femoral artery access, abdominal aortogram, bilateral selective renal angiography, iFR hemodynamic pressure testing to the bilateral renal arteries     OPERATORS: Dwayne Isaac MD     PROCEDURE:  The patient was brought to the cardiac catheterization lab in the fasting state.  Informed consent was obtained.  Moderate sedation was employed using a total of IV Versed 4mg and IV fentanyl 100mg.  I directly observed the patient from 1612 to 1655, for a total of 43 minutes, and an independent trained observer was present and assisted in the monitoring of the patient's level of consciousness and physiological status, watching the heart rate, blood pressure, oximetry, and rhythm, in addition to total moderation time.      ACCESS/CATHETER PLACEMENT:  The groin was prepped and draped in a sterile manner, and the right groin area was anesthetized with 2% lidocaine area.  The right femoral artery was accessed with a micropuncture needle under ultrasound guidance, and a 6-Romanian 11 cm sheath was placed.  A pigtail catheter was advanced over a J wire to the distal aorta,   angiography with proximal pelvic runoff was performed.  Bilateral   selective renal angiography was performed with a 6F JR4 catheter. Hemodynamic pressure testing (iFR) was performed  to the bilateral renal arteries, mainly to measure pressure difference across the ostial lesions of both renal arteries.  At the  conclusion of the study, selective femoral angiography demonstrated suitable access site for closure device; hemostasis was achieved with a perclose suture.      FINDINGS:      1. PERIPHERAL ANGIOGRAM    Aortogram/pelvic angiography  - aortogram: mild intra-renal aortic plaque without significant aneurysm  - selective renal angiography: right- 50% focal ostial stenosis.  left- 30% ostial stenosis    2. PERIPHERAL INTERVENTION: 5000 units of heparin was administered; the bilateral renal arteries were engaged with a 6F JR4 guide catheter;  A John Omni pressure wire was used to measure pressure difference across both ostial lesions; right- 6mmHg, left- 4mmHg differences were measures compared to the abdominal aorta (catheter pressure).  As such, these were deemed hemodynamically insignificant lesions; no intervention was performed.      MEDICATIONS:  See nursing record.     COMPLICATIONS:  No major complications were observed during this   visit to the catheterization lab.     IMPRESSION:    1.  Peripheral angiography  - aortogram/renal angiography: mild intra-renal aortic plaque without significant aneurysm  - selective renal angiography: right- ostial 50% stenosis, left- ostial 30% stenosis  2. Peripheral intervention: Successful hemodynamic testing to the bilateral ostial renal arteries with minimal pressure drop across both lesions, deemed hemodynamically insignificant.      RECOMMENDATIONS: By both angiographic and hemodynamic criteria, the renal arteries are without significant stenosis; no revascularization indicated/performed.

## 2024-02-20 NOTE — PROGRESS NOTES
Patient received from cath lab  Right groin site soft, no hematoma, dressing C/D/I.  Pulses intact.  Hemodynamics stable. Post-op orders received and implemented. Patient and family educated on flat bedrest,. Will continue to monitor.    1900  Hob elevated at 1800, rt groin remains c/d/I. Tolerating po well Pt ambulated in [place] at 1900,rt groin remains unchanged. IV d.cd and discharge instructions reviewed with pt and support person.   1905 Pt discharged to home via wheelchair in stable condition instructions reviewed w pt and spouse.

## 2024-02-20 NOTE — H&P
Patient seen and examined independently. H and P dated 2/13/24 reviewed. No changes in H and P. Risks and benefits of procedure were discussed with patient. Airway examined.  Patient is ASA class 2 and mallampati class 2. Pt is appropriate for conscious sedation. No history of difficult airway.    The risks, benefits, and alternatives of cardiac catheterization were discussed. The risks included, but were not limited to: bleeding, allergic reaction, infection, stroke, myocardial infarction (heart attack), and death. Benefits of the procedure included: symptomatic improvement, diagnosis of heart disease and prevention of myocardial infarction. Alternatives to the procedure included: not performing cardiac catheterization, treatment with medications only, and observation.        Appropriate candidate for Sedation/Analgesia: Yes  Plan for Sedation reviewed: Yes    Explained Anesthesia options and attendant risks, and have determined patient is an appropriate candidate. Yes  Consent for Sedation obtained: Yes  Patient reevaluated immediately prior to Sedation/Analgesia: Yes

## 2024-02-20 NOTE — DISCHARGE INSTRUCTIONS
Follow up with Dr Isaac 3/19 at 3:40 pm    HOME CARE INSTRUCTIONS FOLLOWING CORONARY ANGIOGRAPHY, PERIPHERAL ANGIOGRAPHY, ANGIOPLASTY (PTCA/PTA) OR INSERTION OF STENT IN THE CORONARY, CAROTID, AND/OR PERIPHERAL ARTERIES   Activity:    DO NOT drive after the procedure. You may resume driving late the following day according to the nurse or physician’s instructions    Plan on resting and relaxing tonight and tomorrow    Resume your normal activity after 48 hours, or as instructed by your physician    Do not lift anything over 10 pounds for the next 24 hours    Avoid sexual activity for the next 24 hours    Avoid drinking alcohol for the next 24 hours    If the groin site was used, avoid repeated stair use and excessive walking for the next 24 hours     What is Normal?    A small lump at the procedure site associated with mild tenderness when touched    The procedure site may be bruised or discolored    There may be a small amount of drainage on the bandage   Special Instructions:    Drink plenty of fluids during the next 24 hours to “flush” the contrast from your system    The bandage is to remain in place for 24 hours    Keep the bandage clean and dry    DO NOT submerge the procedure site for 72 hours (no bath tubs or pools)     After 24 hours, you must remove the bandage    You should shower after removing the bandage, and wash the procedure site gently with soap and water    If you choose to wear a bandage for a few days, make sure it remains clean and dry and that it is changed daily   When you should NOTIFY YOUR PHYSICIAN:    Bleeding can occur at the procedure site - both on the outside of the skin and/or beneath the surface of the skin    Swelling or a large lump at the procedure site can occur, which may be accompanied by moderate to severe pain   If either of the above occurs, lie down flat. Have someone apply pressure to the procedure site with both hands, as instructed by the nurse. Hold pressure for 20  minutes and the bleeding should stop. Notify your physician of the occurrence   If the bleeding does not stop, call 911 and continue to apply pressure    If you experience signs of a fever, temperature >101o, chills, infection (redness, swelling, thick yellow drainage, or a foul odor from the procedure site)    If you notice any numbness, tingling, or loss of feeling to your leg or foot for groin access    If you notice any numbness, tingling, or loss of feeling to your fingers or hand, if wrist access was utilized    ur medications was provided to you at discharge.    Continue the walking program initiated in the hospital and progress your walking as directed. Or, gradually resume your previous aerobic exercise schedule as tolerated.    Please keep your previously scheduled appointment with Dr. Isaac     Resume metformin on 2/23 am, call your pcp re blood sugar concerns.    Restart eliquis tonight

## 2024-02-26 ENCOUNTER — HOSPITAL ENCOUNTER (OUTPATIENT)
Dept: INTERVENTIONAL RADIOLOGY/VASCULAR | Facility: HOSPITAL | Age: 69
Discharge: HOME | End: 2024-02-26
Attending: INTERNAL MEDICINE
Payer: MEDICARE

## 2024-06-04 ENCOUNTER — ORDER TRANSCRIPTION (OUTPATIENT)
Dept: PHYSICAL THERAPY | Facility: HOSPITAL | Age: 69
End: 2024-06-04

## 2024-06-04 DIAGNOSIS — M16.12 PRIMARY OSTEOARTHRITIS OF LEFT HIP: ICD-10-CM

## 2024-06-04 DIAGNOSIS — M16.11 PRIMARY OSTEOARTHRITIS OF RIGHT HIP: Primary | ICD-10-CM

## 2024-06-20 ENCOUNTER — TELEPHONE (OUTPATIENT)
Dept: PHYSICAL THERAPY | Facility: HOSPITAL | Age: 69
End: 2024-06-20

## 2024-06-24 ENCOUNTER — OFFICE VISIT (OUTPATIENT)
Dept: PHYSICAL THERAPY | Age: 69
End: 2024-06-24
Attending: STUDENT IN AN ORGANIZED HEALTH CARE EDUCATION/TRAINING PROGRAM

## 2024-06-24 DIAGNOSIS — M16.11 PRIMARY OSTEOARTHRITIS OF RIGHT HIP: Primary | ICD-10-CM

## 2024-06-24 DIAGNOSIS — M16.12 PRIMARY OSTEOARTHRITIS OF LEFT HIP: ICD-10-CM

## 2024-06-24 PROCEDURE — 97161 PT EVAL LOW COMPLEX 20 MIN: CPT | Performed by: PHYSICAL THERAPIST

## 2024-06-24 PROCEDURE — 97110 THERAPEUTIC EXERCISES: CPT | Performed by: PHYSICAL THERAPIST

## 2024-06-24 NOTE — PROGRESS NOTES
LOWER EXTREMITY EVALUATION:     Diagnosis:   Primary osteoarthritis of right hip (M16.11)  Primary osteoarthritis of left hip (M16.12)      Referring Provider: Aquiles Booth  Date of Evaluation:    6/24/2024    Precautions:  None Next MD visit:   none scheduled  Date of Surgery: n/a     PATIENT SUMMARY   John Romero is a 69 year old male who presents to therapy today with complaints of chronic B hip pain and stiffness over the past few years with insidious onset. Symptoms are gradually getting worse. X-rays + for OA.  Pt describes pain level current 0/10, at best 0/10, at worst 3/10.   Current functional limitations include prolonged standing or walking >5-10', yard work, use of stairs, ability to work.     John describes prior level of function full, active. Pt goals include return to PLOF including increasing standing and walking tolerance to allow working part time.  Past medical history was reviewed with John.     ASSESSMENT  John presents to physical therapy evaluation with primary c/o B hip pain and stiffness. The results of the objective tests and measures show decreased ROM, decreased LE strength, decreased flexibility, soft tissue and joint restrictions, and mild gait deviations.  Functional deficits include but are not limited to prolonged standing or walking >5-10', yard work, use of stairs, working.  Signs and symptoms are consistent with diagnosis of B hip OA. Pt and PT discussed evaluation findings, pathology, POC and HEP.  Pt voiced understanding and performs HEP correctly without reported pain. Skilled Physical Therapy is medically necessary to address the above impairments and reach functional goals.     OBJECTIVE:   Observation: Mild lateral trunk deviations while ambulating, but good pace, equal step lengths  Palpation: Mild tightness/tenderness in B quads, lateral hips and gluts      AROM: (* denotes performed with pain)  Hip   Flexion: R 105; L 105  Abduction: R 35; L 25  ER:  R 50; L 50  IR: R 35; L 20     Accessory motion: Mild restrictions B hips    Flexibility:  Hip Flexor: R Mod restrictions, L Mod restrictions  Hamstrings: R <70 deg with SLR; L <70 deg with SLR  Piriformis: R mild restrictions; L Mild restrictions      Strength/MMT: (* denotes performed with pain)  Hip Knee Foot/Ankle   Flexion: R 4/5; L 4/5  Abduction: R 4-/5; L 4-/5  ER: R 4/5; L 4/5  IR: R 4/5; L 4/5 Flexion: R 5/5; L 5/5  Extension: R 5/5; L 5/5    DF: R 5/5; L 5/5  PF: R 5/5; L 5/5           Gait: pt ambulates on level ground with trendelenburg/waddle  Balance: SLS: R NT sec, L NT sec    Today’s Treatment and Response:   Pt education was provided on exam findings, treatment diagnosis, treatment plan, expectations, and prognosis. Pt was also provided recommendations for activity modifications and modalities as needed [ice/heat].  Patient was instructed in and issued a HEP for: Access Code: [email or web address]  URL: [email or web address]  Date: 06/24/2024  Prepared by: Bk Phan    Exercises  - Supine Hamstring Curl on Swiss Ball  - 1 x daily - 7 x weekly - 2 sets - 10 reps  - Hooklying Hamstring Stretch with Strap  - 1 x daily - 7 x weekly - 2 sets - 30 hold  - Supine Piriformis Stretch with Foot on Ground  - 1 x daily - 7 x weekly - 2 sets - 30 hold  - Supine Hip Adduction Isometric with Ball  - 1 x daily - 7 x weekly - 2 sets - 10 reps  - Hooklying Clamshell with Resistance  - 1 x daily - 7 x weekly - 2 sets - 10 reps  - Supine Lower Trunk Rotation  - 1 x daily - 7 x weekly - 2 sets - 10 reps    Charges: PT Eval Low Complexity,       Total Timed Treatment: 40 min     Total Treatment Time: 20 min       PLAN OF CARE:    Goals: (to be met in 8 visits)  Pt will have improved hip AROM Flex to 110 deg and ABD to 40 deg to be able to don/doff shoes and perform car transfers without difficulty   Pt will improve hip ABD and ER strength to 4+/5 to increase ease with standing and walking   Pt will be able  to squat to  light objects around the house with <2/10 hip pain   Pt will improve functional hip strength to report ability to ascend/descend 1 flight of stairs reciprocally without use of handrail   Pt will demonstrate improved SLS to >30 seconds SAMRA to promote safety and decrease risk of falls on uneven surfaces such as grass and gravel   Pt will be independent and compliant with comprehensive HEP to maintain progress achieved in PT      Frequency / Duration: Patient will be seen for 2 x/week or a total of 8 visits over a 90 day period. Treatment will include: Gait training, Manual Therapy, Neuromuscular Re-education, Therapeutic Activities, Therapeutic Exercise, and Home Exercise Program instruction    Education or treatment limitation: None  Rehab Potential:good    LEFS Score  No data recorded    Patient/Family/Caregiver was advised of these findings, precautions, and treatment options and has agreed to actively participate in planning and for this course of care.    Thank you for your referral. Please co-sign or sign and return this letter via fax as soon as possible to [phone]. If you have any questions, please contact me at Dept: [phone]    Sincerely,  Electronically signed by therapist: Bk Phan, PT  Physician's certification required: Yes  I certify the need for these services furnished under this plan of treatment and while under my care.    X___________________________________________________ Date____________________    Certification From: 6/24/2024  To:9/22/2024

## 2024-06-26 ENCOUNTER — OFFICE VISIT (OUTPATIENT)
Dept: PHYSICAL THERAPY | Age: 69
End: 2024-06-26
Attending: STUDENT IN AN ORGANIZED HEALTH CARE EDUCATION/TRAINING PROGRAM

## 2024-06-26 PROCEDURE — 97110 THERAPEUTIC EXERCISES: CPT | Performed by: PHYSICAL THERAPIST

## 2024-06-26 NOTE — PROGRESS NOTES
Diagnosis:   Primary osteoarthritis of right hip (M16.11)  Primary osteoarthritis of left hip (M16.12)      Referring Provider: Aquiles Booth  Date of Evaluation:    6/24/2024    Precautions:  None Next MD visit:   none scheduled  Date of Surgery: n/a   Insurance Primary/Secondary: [employer] MEDICARE / N/A     # Auth Visits: NA            Subjective: Patient states his hips have been feeling very good since initial visit. No significant pain at this time. Performing HEP as instructed with good tolerance.    Pain: 0/10 B hips      Objective: Normal gait mechanics      Assessment: Patient is progressing well with LE strengthening, balance and ROM activities to B hips. Initiated CKC ex's today and performed without increased hip pain. Mild balance deficits with marching on Airex with occasional HHA. General muscle fatigue post treatment.      Goals:   (to be met in 8 visits)  Pt will have improved hip AROM Flex to 110 deg and ABD to 40 deg to be able to don/doff shoes and perform car transfers without difficulty   Pt will improve hip ABD and ER strength to 4+/5 to increase ease with standing and walking   Pt will be able to squat to  light objects around the house with <2/10 hip pain   Pt will improve functional hip strength to report ability to ascend/descend 1 flight of stairs reciprocally without use of handrail   Pt will demonstrate improved SLS to >30 seconds SAMRA to promote safety and decrease risk of falls on uneven surfaces such as grass and gravel   Pt will be independent and compliant with comprehensive HEP to maintain progress achieved in PT      Plan: Continue to progress with LE strengthening and balance activities.  Date: 6/26/2024  TX#: 2/8 Date:                 TX#: 3/ Date:                 TX#: 4/ Date:                 TX#: 5/ Date:   Tx#: 6/   Ther Ex: 40'  NuStep 8\" Lv 3  Supine SB HS curls, x20  LTR stretch x20  HS stretch with strap, 2x30\" B  Piriformis stretch, 2x30\" B  Hooklying  hip add ball squeeze, x20  Hooklying hip abd with BTB, x20  Bridges, 1x10  Shuttle leg press, 125#, 3x10  Side stepping, no resistance, 2 laps       NMR: 4'  Tandem standing on Airex, 2x60\"  Marching on Airex, 60\"                     HEP:  Access Code: [email or web address]  URL: [email or web address]  Date: 06/26/2024  Prepared by: Bk Phan    Exercises  - Hooklying Hamstring Stretch with Strap  - 1 x daily - 7 x weekly - 2 sets - 30 hold  - Supine Piriformis Stretch with Foot on Ground  - 1 x daily - 7 x weekly - 2 sets - 30 hold  - Supine Hip Adduction Isometric with Ball  - 1 x daily - 7 x weekly - 2 sets - 10 reps  - Hooklying Clamshell with Resistance  - 1 x daily - 7 x weekly - 2 sets - 10 reps  - Supine Lower Trunk Rotation  - 1 x daily - 7 x weekly - 2 sets - 10 reps  - Sidestepping  - 1 x daily - 7 x weekly - 2 sets  - Standing Tandem Balance with Counter Support  - 1 x daily - 7 x weekly - 2 sets - 60 hold  - Supine Hamstring Swiss Ball Curls/Dynamic Mobilization  - 1 x daily - 7 x weekly - 3 sets - 10 reps  Charges: 3 Ther ex        Total Timed Treatment: 44 min  Total Treatment Time: 44 min

## 2024-07-02 ENCOUNTER — OFFICE VISIT (OUTPATIENT)
Dept: PHYSICAL THERAPY | Age: 69
End: 2024-07-02
Attending: STUDENT IN AN ORGANIZED HEALTH CARE EDUCATION/TRAINING PROGRAM
Payer: MEDICARE

## 2024-07-02 PROCEDURE — 97110 THERAPEUTIC EXERCISES: CPT | Performed by: PHYSICAL THERAPIST

## 2024-07-02 NOTE — PROGRESS NOTES
Diagnosis:   Primary osteoarthritis of right hip (M16.11)  Primary osteoarthritis of left hip (M16.12)      Referring Provider: Aquiles Booth  Date of Evaluation:    6/24/2024    Precautions:  None Next MD visit:   none scheduled  Date of Surgery: n/a   Insurance Primary/Secondary: [employer] MEDICARE / N/A     # Auth Visits: NA            Subjective: Patient states his walking tolerance is improving without increasing B hip pain. Able to walk approx 30' at this time.    Pain: 0/10 B hips      Objective: Mild balance deficits in tandem standing on Airex      Assessment: Patient is progressing well with LE strengthening and balance activities with emphasis on increasing hip abd/ext strength. Tolerated well with general fatigue post treatment but no reports of hip pain. Gait remains normal.      Goals:   (to be met in 8 visits)  Pt will have improved hip AROM Flex to 110 deg and ABD to 40 deg to be able to don/doff shoes and perform car transfers without difficulty   Pt will improve hip ABD and ER strength to 4+/5 to increase ease with standing and walking   Pt will be able to squat to  light objects around the house with <2/10 hip pain   Pt will improve functional hip strength to report ability to ascend/descend 1 flight of stairs reciprocally without use of handrail   Pt will demonstrate improved SLS to >30 seconds SAMRA to promote safety and decrease risk of falls on uneven surfaces such as grass and gravel   Pt will be independent and compliant with comprehensive HEP to maintain progress achieved in PT      Plan: Continue to progress with LE strengthening and balance activities.  Date: 6/26/2024  TX#: 2/8 Date:7/2/2024                 TX#: 3/8 Date:                 TX#: 4/ Date:                 TX#: 5/ Date:   Tx#: 6/   Ther Ex: 40'  NuStep 8\" Lv 3  Supine SB HS curls, x20  LTR stretch x20  HS stretch with strap, 2x30\" B  Piriformis stretch, 2x30\" B  Hooklying hip add ball squeeze, x20  Hooklying hip  abd with BTB, x20  Bridges, 1x10  Shuttle leg press, 125#, 3x10  Side stepping, no resistance, 2 laps Ther Ex: 40'  NuStep 8' Lv 3  Supine SB HS curls, x30  LTR stretch x20  HS stretch with strap, 2x30\" B  Piriformis stretch, 2x30\" B  Hooklying hip add ball squeeze, x20  Hooklying hip abd with BTB, x20  Standing hip abd, ext with red cuff on ankles, 2x10 ea B  Shuttle leg press, 125#, 2x20      NMR: 4'  Tandem standing on Airex, 2x60\"  Marching on Airex, 60\" NMR: 5'  Tandem standing on Airex, 2x60\"  Step march on 6\" step, x10 B with min HHA                    HEP:  Access Code: [email or web address]  URL: [email or web address]  Date: 06/26/2024  Prepared by: Bk Phan    Exercises  - Hooklying Hamstring Stretch with Strap  - 1 x daily - 7 x weekly - 2 sets - 30 hold  - Supine Piriformis Stretch with Foot on Ground  - 1 x daily - 7 x weekly - 2 sets - 30 hold  - Supine Hip Adduction Isometric with Ball  - 1 x daily - 7 x weekly - 2 sets - 10 reps  - Hooklying Clamshell with Resistance  - 1 x daily - 7 x weekly - 2 sets - 10 reps  - Supine Lower Trunk Rotation  - 1 x daily - 7 x weekly - 2 sets - 10 reps  - Sidestepping  - 1 x daily - 7 x weekly - 2 sets  - Standing Tandem Balance with Counter Support  - 1 x daily - 7 x weekly - 2 sets - 60 hold  - Supine Hamstring Swiss Ball Curls/Dynamic Mobilization  - 1 x daily - 7 x weekly - 3 sets - 10 reps  Charges: 3 Ther ex        Total Timed Treatment: 45 min  Total Treatment Time: 45 min

## 2024-07-09 ENCOUNTER — OFFICE VISIT (OUTPATIENT)
Dept: PHYSICAL THERAPY | Age: 69
End: 2024-07-09
Attending: STUDENT IN AN ORGANIZED HEALTH CARE EDUCATION/TRAINING PROGRAM
Payer: MEDICARE

## 2024-07-09 PROCEDURE — 97110 THERAPEUTIC EXERCISES: CPT | Performed by: PHYSICAL THERAPIST

## 2024-07-09 NOTE — PROGRESS NOTES
Diagnosis:   Primary osteoarthritis of right hip (M16.11)  Primary osteoarthritis of left hip (M16.12)      Referring Provider: Aquiles Booth  Date of Evaluation:    6/24/2024    Precautions:  None Next MD visit:   none scheduled  Date of Surgery: n/a   Insurance Primary/Secondary: [employer] MEDICARE / N/A     # Auth Visits: NA            Subjective: Patient reports good walking tolerance as long as he takes occasional sitting rests. No significant hip pain at this time. Performing HEP with good tolerance.    Pain: 0/10 B hips      Objective:  Mild joint restrictions B hips into IR/ER      Assessment: Mild joint restrictions persist in B hip ROM, but no significant ERP. Improving LE flexibility. Progressing well with LE strengthening and balance activities without increased hip symptoms. Improved stability during tandem standing and step ups with a march.      Goals:   (to be met in 8 visits)  Pt will have improved hip AROM Flex to 110 deg and ABD to 40 deg to be able to don/doff shoes and perform car transfers without difficulty   Pt will improve hip ABD and ER strength to 4+/5 to increase ease with standing and walking   Pt will be able to squat to  light objects around the house with <2/10 hip pain   Pt will improve functional hip strength to report ability to ascend/descend 1 flight of stairs reciprocally without use of handrail   Pt will demonstrate improved SLS to >30 seconds SAMRA to promote safety and decrease risk of falls on uneven surfaces such as grass and gravel   Pt will be independent and compliant with comprehensive HEP to maintain progress achieved in PT      Plan: Continue to progress with LE strengthening and balance activities to improve tolerance to walking community distances and ascend/descend a flight of stairs.  Date: 6/26/2024  TX#: 2/8 Date:7/2/2024                 TX#: 3/8 Date: 7/9/2024                TX#: 4/8 Date:                 TX#: 5/ Date:   Tx#: 6/ Ther Ex:  40'  NuStep 8\" Lv 3  Supine SB HS curls, x20  LTR stretch x20  HS stretch with strap, 2x30\" B  Piriformis stretch, 2x30\" B  Hooklying hip add ball squeeze, x20  Hooklying hip abd with BTB, x20  Bridges, 1x10  Shuttle leg press, 125#, 3x10  Side stepping, no resistance, 2 laps Ther Ex: 40'  NuStep 8' Lv 3  Supine SB HS curls, x30  LTR stretch x20  HS stretch with strap, 2x30\" B  Piriformis stretch, 2x30\" B  Hooklying hip add ball squeeze, x20  Hooklying hip abd with BTB, x20  Standing hip abd, ext with red cuff on ankles, 2x10 ea B  Shuttle leg press, 125#, 2x20 Ther Ex: 42'  NuStep 8' Lv 3  Supine SB HS curls, x30  LTR stretch x20 B  HS stretch with strap, 2x30\" B  Piriformis stretch, 2x30\" B  Bridges, 2x10  Hooklying hip abd with Gray band, x30  Standing hip abd, ext with red cuff on ankles, 2x10 ea B  Shuttle leg press, 125#, 2x20  Shuttle single leg press, 62# x20 B  TRX squats, 1x20     NMR: 4'  Tandem standing on Airex, 2x60\"  Marching on Airex, 60\" NMR: 5'  Tandem standing on Airex, 2x60\"  Step march on 6\" step, x10 B with min HHA NMR: 5'  Tandem standing on Airex, 2x60\"  Step march on 6\" step, x20 B with min HHA                   HEP:  Access Code: [email or web address]  URL: [email or web address]  Date: 06/26/2024  Prepared by: Bk Phan    Exercises  - Hooklying Hamstring Stretch with Strap  - 1 x daily - 7 x weekly - 2 sets - 30 hold  - Supine Piriformis Stretch with Foot on Ground  - 1 x daily - 7 x weekly - 2 sets - 30 hold  - Supine Hip Adduction Isometric with Ball  - 1 x daily - 7 x weekly - 2 sets - 10 reps  - Hooklying Clamshell with Resistance  - 1 x daily - 7 x weekly - 2 sets - 10 reps  - Supine Lower Trunk Rotation  - 1 x daily - 7 x weekly - 2 sets - 10 reps  - Sidestepping  - 1 x daily - 7 x weekly - 2 sets  - Standing Tandem Balance with Counter Support  - 1 x daily - 7 x weekly - 2 sets - 60 hold  - Supine Hamstring Swiss Ball Curls/Dynamic Mobilization  - 1 x daily - 7 x weekly  - 3 sets - 10 reps  Charges: 3 Ther ex        Total Timed Treatment: 47 min  Total Treatment Time: 47 min

## 2024-07-11 ENCOUNTER — OFFICE VISIT (OUTPATIENT)
Dept: PHYSICAL THERAPY | Age: 69
End: 2024-07-11
Attending: STUDENT IN AN ORGANIZED HEALTH CARE EDUCATION/TRAINING PROGRAM
Payer: MEDICARE

## 2024-07-11 PROCEDURE — 97110 THERAPEUTIC EXERCISES: CPT | Performed by: PHYSICAL THERAPIST

## 2024-07-11 NOTE — PROGRESS NOTES
Diagnosis:   Primary osteoarthritis of right hip (M16.11)  Primary osteoarthritis of left hip (M16.12)      Referring Provider: Aquiles Booth  Date of Evaluation:    6/24/2024    Precautions:  None Next MD visit:   none scheduled  Date of Surgery: n/a   Insurance Primary/Secondary: [employer] MEDICARE / N/A     # Auth Visits: NA            Subjective: Patient states he had a little L knee pain yesterday while using stairs, but symptoms have resolved today. No pain in the hips. Tolerated last treatment well without increased symptoms.    Pain: 0/10 B hips      Objective:  Normal gait      Assessment: Patient demonstrates improved balance during tandem standing on Airex without HHA. Progressing well with LE strengthening with emphasis on hip abd and ext strength. General fatigue post treatment but no pain complaints.      Goals:   (to be met in 8 visits)  Pt will have improved hip AROM Flex to 110 deg and ABD to 40 deg to be able to don/doff shoes and perform car transfers without difficulty   Pt will improve hip ABD and ER strength to 4+/5 to increase ease with standing and walking   Pt will be able to squat to  light objects around the house with <2/10 hip pain   Pt will improve functional hip strength to report ability to ascend/descend 1 flight of stairs reciprocally without use of handrail   Pt will demonstrate improved SLS to >30 seconds SAMRA to promote safety and decrease risk of falls on uneven surfaces such as grass and gravel   Pt will be independent and compliant with comprehensive HEP to maintain progress achieved in PT      Plan: Continue to progress with strengthening and balance activities.  Date: 6/26/2024  TX#: 2/8 Date:7/2/2024                 TX#: 3/8 Date: 7/9/2024                TX#: 4/8 Date:7/11/2024                TX#: 5/8 Date:   Tx#: 6/   Ther Ex: 40'  NuStep 8\" Lv 3  Supine SB HS curls, x20  LTR stretch x20  HS stretch with strap, 2x30\" B  Piriformis stretch, 2x30\"  B  Hooklying hip add ball squeeze, x20  Hooklying hip abd with BTB, x20  Bridges, 1x10  Shuttle leg press, 125#, 3x10  Side stepping, no resistance, 2 laps Ther Ex: 40'  NuStep 8' Lv 3  Supine SB HS curls, x30  LTR stretch x20  HS stretch with strap, 2x30\" B  Piriformis stretch, 2x30\" B  Hooklying hip add ball squeeze, x20  Hooklying hip abd with BTB, x20  Standing hip abd, ext with red cuff on ankles, 2x10 ea B  Shuttle leg press, 125#, 2x20 Ther Ex: 42'  NuStep 8' Lv 3  Supine SB HS curls, x30  LTR stretch x20 B  HS stretch with strap, 2x30\" B  Piriformis stretch, 2x30\" B  Bridges, 2x10  Hooklying hip abd with Gray band, x30  Standing hip abd, ext with red cuff on ankles, 2x10 ea B  Shuttle leg press, 125#, 2x20  Shuttle single leg press, 62# x20 B  TRX squats, 1x20 Ther Ex: 40'  NuStep 5' Lv 3  Supine SB HS curls, x30  LTR stretch x20 B  HS stretch with strap, 2x30\" B  Piriformis stretch, 2x30\" B  SB Bridges, 2x10  S/L clams 2x10 B  Shuttle leg press, 125#, 3x15  Shuttle single leg press, 62# x20 B  TRX squats, 1x20  Side stepping, 2 laps, R tubing at ankles    NMR: 4'  Tandem standing on Airex, 2x60\"  Marching on Airex, 60\" NMR: 5'  Tandem standing on Airex, 2x60\"  Step march on 6\" step, x10 B with min HHA NMR: 5'  Tandem standing on Airex, 2x60\"  Step march on 6\" step, x20 B with min HHA NMR: 2'  Tandem standing on Airex, 2x60\"                    HEP:  Access Code: [email or web address]  URL: [email or web address]  Date: 06/26/2024  Prepared by: Bk Phan    Exercises  - Hooklying Hamstring Stretch with Strap  - 1 x daily - 7 x weekly - 2 sets - 30 hold  - Supine Piriformis Stretch with Foot on Ground  - 1 x daily - 7 x weekly - 2 sets - 30 hold  - Supine Hip Adduction Isometric with Ball  - 1 x daily - 7 x weekly - 2 sets - 10 reps  - Hooklying Clamshell with Resistance  - 1 x daily - 7 x weekly - 2 sets - 10 reps  - Supine Lower Trunk Rotation  - 1 x daily - 7 x weekly - 2 sets - 10 reps  -  Sidestepping  - 1 x daily - 7 x weekly - 2 sets  - Standing Tandem Balance with Counter Support  - 1 x daily - 7 x weekly - 2 sets - 60 hold  - Supine Hamstring Swiss Ball Curls/Dynamic Mobilization  - 1 x daily - 7 x weekly - 3 sets - 10 reps  Charges: 3 Ther ex        Total Timed Treatment: 42 min  Total Treatment Time: 42 min

## 2024-07-16 ENCOUNTER — APPOINTMENT (OUTPATIENT)
Dept: PHYSICAL THERAPY | Age: 69
End: 2024-07-16
Attending: STUDENT IN AN ORGANIZED HEALTH CARE EDUCATION/TRAINING PROGRAM
Payer: MEDICARE

## 2024-07-18 ENCOUNTER — OFFICE VISIT (OUTPATIENT)
Dept: PHYSICAL THERAPY | Age: 69
End: 2024-07-18
Attending: STUDENT IN AN ORGANIZED HEALTH CARE EDUCATION/TRAINING PROGRAM
Payer: MEDICARE

## 2024-07-18 PROCEDURE — 97110 THERAPEUTIC EXERCISES: CPT | Performed by: PHYSICAL THERAPIST

## 2024-07-18 NOTE — PROGRESS NOTES
Diagnosis:   Primary osteoarthritis of right hip (M16.11)  Primary osteoarthritis of left hip (M16.12)      Referring Provider: Aquiles Booth  Date of Evaluation:    6/24/2024    Precautions:  None Next MD visit:   none scheduled  Date of Surgery: n/a   Insurance Primary/Secondary: [employer] MEDICARE / N/A     # Auth Visits: NA            Subjective: Patient states he has had increased B knee pain and LE tightness over the past few days that has limited activity levels, but denies any significant B hip pain.    Pain: 0/10 B hips, 2/10 L knee      Objective:  LE flexibility deficits in B HS and calves      Assessment: Patient was able to tolerate LE stretching, strengthening, and balance activities well without knee or hip pain. Demonstrates improving balance, able to hold 15\" B without HA. Normal gait mechanics.      Goals:   (to be met in 8 visits)  Pt will have improved hip AROM Flex to 110 deg and ABD to 40 deg to be able to don/doff shoes and perform car transfers without difficulty   Pt will improve hip ABD and ER strength to 4+/5 to increase ease with standing and walking   Pt will be able to squat to  light objects around the house with <2/10 hip pain   Pt will improve functional hip strength to report ability to ascend/descend 1 flight of stairs reciprocally without use of handrail   Pt will demonstrate improved SLS to >30 seconds SAMRA to promote safety and decrease risk of falls on uneven surfaces such as grass and gravel   Pt will be independent and compliant with comprehensive HEP to maintain progress achieved in PT      Plan: Continue therapy per POC to increase strength and balance 2 additional visits, then plan to DC to HEP.  Date: 6/26/2024  TX#: 2/8 Date:7/2/2024                 TX#: 3/8 Date: 7/9/2024                TX#: 4/8 Date:7/11/2024                TX#: 5/8 Date:7/18/2024   Tx#: 6/8   Ther Ex: 40'  NuStep 8\" Lv 3  Supine SB HS curls, x20  LTR stretch x20  HS stretch with strap,  2x30\" B  Piriformis stretch, 2x30\" B  Hooklying hip add ball squeeze, x20  Hooklying hip abd with BTB, x20  Bridges, 1x10  Shuttle leg press, 125#, 3x10  Side stepping, no resistance, 2 laps Ther Ex: 40'  NuStep 8' Lv 3  Supine SB HS curls, x30  LTR stretch x20  HS stretch with strap, 2x30\" B  Piriformis stretch, 2x30\" B  Hooklying hip add ball squeeze, x20  Hooklying hip abd with BTB, x20  Standing hip abd, ext with red cuff on ankles, 2x10 ea B  Shuttle leg press, 125#, 2x20 Ther Ex: 42'  NuStep 8' Lv 3  Supine SB HS curls, x30  LTR stretch x20 B  HS stretch with strap, 2x30\" B  Piriformis stretch, 2x30\" B  Bridges, 2x10  Hooklying hip abd with Gray band, x30  Standing hip abd, ext with red cuff on ankles, 2x10 ea B  Shuttle leg press, 125#, 2x20  Shuttle single leg press, 62# x20 B  TRX squats, 1x20 Ther Ex: 40'  NuStep 5' Lv 3  Supine SB HS curls, x30  LTR stretch x20 B  HS stretch with strap, 2x30\" B  Piriformis stretch, 2x30\" B  SB Bridges, 2x10  S/L clams 2x10 B  Shuttle leg press, 125#, 3x15  Shuttle single leg press, 62# x20 B  TRX squats, 1x20  Side stepping, 2 laps, R tubing at ankles Ther Ex: 42'  NuStep 7' Lv 3  Supine SB HS curls, x30  LTR stretch x20 B  HS stretch with strap, 2x30\" B  Piriformis stretch, 2x30\" B  Standing calf stretch on 1/2 foam, 2x30\"B  Hooklying hip abd, Gray band, 3x10  S/L clams 2x10 B  Shuttle leg press, 125#, 3x15  Shuttle single leg press, 62# x30 B  TRX squats, 1x20  Standing hip abd, ext, YTB on ankles, 2x10 ea B     NMR: 4'  Tandem standing on Airex, 2x60\"  Marching on Airex, 60\" NMR: 5'  Tandem standing on Airex, 2x60\"  Step march on 6\" step, x10 B with min HHA NMR: 5'  Tandem standing on Airex, 2x60\"  Step march on 6\" step, x20 B with min HHA NMR: 2'  Tandem standing on Airex, 2x60\"   NMR: 4'  SLS balance, 3x15\"  Slow march on Airex, 2x60\"                 HEP:  Access Code: [email or web address]  URL: [email or web address]  Date: 07/18/2024  Prepared by: Bk  Gervase    Exercises  - Hooklying Hamstring Stretch with Strap  - 1 x daily - 7 x weekly - 2 sets - 30 hold  - Supine Piriformis Stretch with Foot on Ground  - 1 x daily - 7 x weekly - 2 sets - 30 hold  - Supine Hip Adduction Isometric with Ball  - 1 x daily - 7 x weekly - 2 sets - 10 reps  - Hooklying Clamshell with Resistance  - 1 x daily - 7 x weekly - 2 sets - 10 reps  - Supine Lower Trunk Rotation  - 1 x daily - 7 x weekly - 2 sets - 10 reps  - Sidestepping  - 1 x daily - 7 x weekly - 2 sets  - Standing Tandem Balance with Counter Support  - 1 x daily - 7 x weekly - 2 sets - 60 hold  - Supine Hamstring Swiss Ball Curls/Dynamic Mobilization  - 1 x daily - 7 x weekly - 3 sets - 10 reps  - Gastroc Stretch on Step  - 1 x daily - 7 x weekly - 2 sets - 30 hold  - Standing March with Counter Support  - 1 x daily - 7 x weekly - 2 sets - 10 reps  - Supine Bridge  - 1 x daily - 7 x weekly - 2 sets - 10 reps    Charges: 3 Ther ex        Total Timed Treatment: 46 min  Total Treatment Time: 46 min

## 2024-07-22 ENCOUNTER — TELEPHONE (OUTPATIENT)
Dept: PHYSICAL THERAPY | Age: 69
End: 2024-07-22

## 2024-07-24 ENCOUNTER — TELEPHONE (OUTPATIENT)
Dept: PHYSICAL THERAPY | Age: 69
End: 2024-07-24

## 2024-08-08 ENCOUNTER — OFFICE VISIT (OUTPATIENT)
Dept: PHYSICAL THERAPY | Age: 69
End: 2024-08-08
Attending: STUDENT IN AN ORGANIZED HEALTH CARE EDUCATION/TRAINING PROGRAM
Payer: MEDICARE

## 2024-08-08 PROCEDURE — 97110 THERAPEUTIC EXERCISES: CPT | Performed by: PHYSICAL THERAPIST

## 2024-08-08 NOTE — PROGRESS NOTES
Diagnosis:   Primary osteoarthritis of right hip (M16.11)  Primary osteoarthritis of left hip (M16.12)      Referring Provider: CHI ALY  Date of Evaluation:    6/24/2024    Precautions:  None Next MD visit:   none scheduled  Date of Surgery: n/a   Insurance Primary/Secondary: [employer] MEDICARE / N/A     # Auth Visits: NA            Subjective: Patient states he developed significant LBP 2-3 weeks ago after lifting a heavy case of water. Limiting activity levels to allow pain levels to improve. Feeling pretty good at this time. Returning to performing HEP with good tolerance.    Pain: 0/10 B hips, 1-2/10 L/S      Objective:  Mild forward flexed posture while ambulating otherwise normal gait      Assessment: Patient demonstrates improving LE flexibility, and LE and core strength continues to improve. Tolerated PREs well today without increased symptoms in B hips or low back.      Goals:   (to be met in 8 visits)  Pt will have improved hip AROM Flex to 110 deg and ABD to 40 deg to be able to don/doff shoes and perform car transfers without difficulty   Pt will improve hip ABD and ER strength to 4+/5 to increase ease with standing and walking   Pt will be able to squat to  light objects around the house with <2/10 hip pain   Pt will improve functional hip strength to report ability to ascend/descend 1 flight of stairs reciprocally without use of handrail   Pt will demonstrate improved SLS to >30 seconds SAMRA to promote safety and decrease risk of falls on uneven surfaces such as grass and gravel   Pt will be independent and compliant with comprehensive HEP to maintain progress achieved in PT      Plan: Continue one additional visit to progress with LE strengthening and balance activities then plan to DC to HEP.  Date:7/2/2024                 TX#: 3/8 Date: 7/9/2024                TX#: 4/8 Date:7/11/2024                TX#: 5/8 Date:7/18/2024   Tx#: 6/8 Date:8/8/2024   Tx#: 7/8   Ther Ex:  40'  NuStep 8' Lv 3  Supine SB HS curls, x30  LTR stretch x20  HS stretch with strap, 2x30\" B  Piriformis stretch, 2x30\" B  Hooklying hip add ball squeeze, x20  Hooklying hip abd with BTB, x20  Standing hip abd, ext with red cuff on ankles, 2x10 ea B  Shuttle leg press, 125#, 2x20 Ther Ex: 42'  NuStep 8' Lv 3  Supine SB HS curls, x30  LTR stretch x20 B  HS stretch with strap, 2x30\" B  Piriformis stretch, 2x30\" B  Bridges, 2x10  Hooklying hip abd with Gray band, x30  Standing hip abd, ext with red cuff on ankles, 2x10 ea B  Shuttle leg press, 125#, 2x20  Shuttle single leg press, 62# x20 B  TRX squats, 1x20 Ther Ex: 40'  NuStep 5' Lv 3  Supine SB HS curls, x30  LTR stretch x20 B  HS stretch with strap, 2x30\" B  Piriformis stretch, 2x30\" B  SB Bridges, 2x10  S/L clams 2x10 B  Shuttle leg press, 125#, 3x15  Shuttle single leg press, 62# x20 B  TRX squats, 1x20  Side stepping, 2 laps, R tubing at ankles Ther Ex: 42' NuStep 7' Lv 3  Supine SB HS curls, x30  LTR stretch x20 B  HS stretch with strap, 2x30\" B  Piriformis stretch, 2x30\" B  Standing calf stretch on 1/2 foam, 2x30\"B  Hooklying hip abd, Gray band, 3x10  S/L clams 2x10 B  Shuttle leg press, 125#, 3x15  Shuttle single leg press, 62# x30 B  TRX squats, 1x20  Standing hip abd, ext, YTB on ankles, 2x10 ea B   Ther Ex: 42' NuStep 7' Lv 3  Supine SB HS curls, x30  LTR stretch x20 B  HS stretch with strap, 2x30\" B  Piriformis stretch, 2x30\" B  Supine march x20  Standing calf stretch on 1/2 foam, 2x30\"B  Hooklying hip abd, Gray band, 3x10  S/L clams 2x10 B  Shuttle leg press, 125#, 2x20  Shuttle single leg press, 62# x25 B  Standing hip abd, ext, YTB on ankles, 2x10 ea B   NMR: 5'  Tandem standing on Airex, 2x60\"  Step march on 6\" step, x10 B with min HHA NMR: 5'  Tandem standing on Airex, 2x60\"  Step march on 6\" step, x20 B with min HHA NMR: 2'  Tandem standing on Airex, 2x60\"   NMR: 4'  SLS balance, 3x15\"  Slow march on Airex, 2x60\" NMR: 3'  SLS balance, 3x15\" on  Donna                   HEP:  Access Code: [email or web address]  URL: [email or web address]  Date: 07/18/2024  Prepared by: Bk Phan    Exercises  - Hooklying Hamstring Stretch with Strap  - 1 x daily - 7 x weekly - 2 sets - 30 hold  - Supine Piriformis Stretch with Foot on Ground  - 1 x daily - 7 x weekly - 2 sets - 30 hold  - Supine Hip Adduction Isometric with Ball  - 1 x daily - 7 x weekly - 2 sets - 10 reps  - Hooklying Clamshell with Resistance  - 1 x daily - 7 x weekly - 2 sets - 10 reps  - Supine Lower Trunk Rotation  - 1 x daily - 7 x weekly - 2 sets - 10 reps  - Sidestepping  - 1 x daily - 7 x weekly - 2 sets  - Standing Tandem Balance with Counter Support  - 1 x daily - 7 x weekly - 2 sets - 60 hold  - Supine Hamstring Swiss Ball Curls/Dynamic Mobilization  - 1 x daily - 7 x weekly - 3 sets - 10 reps  - Gastroc Stretch on Step  - 1 x daily - 7 x weekly - 2 sets - 30 hold  - Standing March with Counter Support  - 1 x daily - 7 x weekly - 2 sets - 10 reps  - Supine Bridge  - 1 x daily - 7 x weekly - 2 sets - 10 reps    Charges: 3 Ther ex        Total Timed Treatment: 45 min  Total Treatment Time: 45 min

## 2024-08-14 ENCOUNTER — APPOINTMENT (OUTPATIENT)
Dept: PHYSICAL THERAPY | Age: 69
End: 2024-08-14
Attending: STUDENT IN AN ORGANIZED HEALTH CARE EDUCATION/TRAINING PROGRAM
Payer: MEDICARE

## 2024-08-20 ENCOUNTER — TELEPHONE (OUTPATIENT)
Dept: ORTHOPEDICS CLINIC | Facility: CLINIC | Age: 69
End: 2024-08-20

## 2024-08-20 DIAGNOSIS — M25.561 PAIN IN BOTH KNEES, UNSPECIFIED CHRONICITY: Primary | ICD-10-CM

## 2024-08-20 DIAGNOSIS — M25.562 PAIN IN BOTH KNEES, UNSPECIFIED CHRONICITY: Primary | ICD-10-CM

## 2024-08-20 NOTE — TELEPHONE ENCOUNTER
[facility]RA [facility]. Please advise if imaging is needed.  Future Appointments   Date Time Provider Department Center   9/6/2024  8:00 AM Bk Phan, PT [facility]   10/4/2024  9:40 AM Will Oviedo MD [facility]

## 2024-08-26 ENCOUNTER — HOSPITAL ENCOUNTER (OUTPATIENT)
Dept: GENERAL RADIOLOGY | Age: 69
Discharge: HOME | End: 2024-08-26
Attending: ORTHOPAEDIC SURGERY
Payer: MEDICARE

## 2024-08-26 DIAGNOSIS — M25.561 PAIN IN BOTH KNEES, UNSPECIFIED CHRONICITY: ICD-10-CM

## 2024-08-26 DIAGNOSIS — M25.562 PAIN IN BOTH KNEES, UNSPECIFIED CHRONICITY: ICD-10-CM

## 2024-08-26 PROCEDURE — 73564 X-RAY EXAM KNEE 4 OR MORE: CPT | Performed by: ORTHOPAEDIC SURGERY

## 2024-09-06 ENCOUNTER — OFFICE VISIT (OUTPATIENT)
Dept: PHYSICAL THERAPY | Age: 69
End: 2024-09-06
Attending: STUDENT IN AN ORGANIZED HEALTH CARE EDUCATION/TRAINING PROGRAM
Payer: MEDICARE

## 2024-09-06 PROCEDURE — 97110 THERAPEUTIC EXERCISES: CPT | Performed by: PHYSICAL THERAPIST

## 2024-09-06 NOTE — PROGRESS NOTES
Progress Summary  Pt has attended 8 visits in Physical Therapy.      Diagnosis:   Primary osteoarthritis of right hip (M16.11)  Primary osteoarthritis of left hip (M16.12)      Referring Provider: CHI ALY  Date of Evaluation:    6/24/2024    Precautions:  None Next MD visit:   none scheduled  Date of Surgery: n/a   Insurance Primary/Secondary: [employer]ARE / N/A     # Auth Visits: 8            Subjective: Patient reports increased B knee pain L>R over the past few weeks as well as increased LBP. Decreased tolerance to ascending/descending stairs over that time. Patient states he has avoided doing a lot of exercising due to pain. Overall symptoms are much better today.    Pain: L knee 0/10. B hip 0/10 today but intermittent increased pain with increased activity levels.      Objective:    Observation: Minimal gait deviations today  Palpation: Mild palpable tenderness in lateral hips, quads, ITB/TFL        AROM: (* denotes performed with pain)  Hip   Flexion: R 108; L 105  Abduction: R 35; L 30  ER: R 50; L 50  IR: R 35; L 25      Accessory motion: Mild restrictions B hips     Flexibility:  Hip Flexor: R Mod restrictions, L Mod restrictions  Hamstrings: R <70 deg with SLR; L <70 deg with SLR  Piriformis: R mild restrictions; L Mild restrictions        Strength/MMT: (* denotes performed with pain)  Hip Knee Foot/Ankle   Flexion: R 4+/5; L 4+/5  Abduction: R 4/5; L 4/5  ER: R 4+/5; L 4+/5  IR: R 5/5; L 5/5 Flexion: R 5/5; L 5/5  Extension: R 5/5; L 5/5    DF: R 5/5; L 5/5  PF: R 5/5; L 5/5               Gait: pt ambulates on level ground with normal gait  Balance: SLS: R <15\" sec, L <15\" sec      Assessment: Patient has completed 8 visits of skilled PT for a diagnosis of B hip OA. Patient had recent exacerbation of pain in his L knee and low back that has limited his ability to participate in PT over the past 2-3 weeks. Pain levels are now significantly improved and patient has returned to PT.   Patient has made objective improvements including increased B hip ROM, increased LE and core strength, decreased soft tissue restrictions, improving balance and gait mechanics. Patient demonstrates improved functional tolerance to prolonged standing/walking up to 10' and use of a few stairs. Patient continues to have objective deficits including decreased ROM, strength and balance limiting tolerance to prolonged standing and walking >10', ascending/descending a flight of stairs with reciprocal pattern, and performing some house and yard work. Pt and PT discussed evaluation findings, pathology, POC and HEP.  Pt voiced understanding and performs HEP correctly without reported pain. Skilled Physical Therapy is medically necessary to address the above impairments and reach functional goals.       Goals:   (to be met in 8 visits)  Pt will have improved hip AROM Flex to 110 deg and ABD to 40 deg to be able to don/doff shoes and perform car transfers without difficulty   Pt will improve hip ABD and ER strength to 4+/5 to increase ease with standing and walking   Pt will be able to squat to  light objects around the house with <2/10 hip pain   Pt will improve functional hip strength to report ability to ascend/descend 1 flight of stairs reciprocally without use of handrail   Pt will demonstrate improved SLS to >30 seconds SAMRA to promote safety and decrease risk of falls on uneven surfaces such as grass and gravel   Pt will be independent and compliant with comprehensive HEP to maintain progress achieved in PT        Plan: Continue skilled Physical Therapy 1 x/week or a total of 4 visits over a 90 day period. Treatment will include: Gait training, Manual Therapy, Neuromuscular Re-education, Therapeutic Activities, Therapeutic Exercise, and Home Exercise Program instruction        Patient/Family/Caregiver was advised of these findings, precautions, and treatment options and has agreed to actively participate in  planning and for this course of care.    Thank you for your referral. If you have any questions, please contact me at Dept: [phone].    Sincerely,  Electronically signed by therapist: Bk Phan PT     Physician's certification required:  Yes  Please co-sign or sign and return this letter via fax as soon as possible to [phone].   I certify the need for these services furnished under this plan of treatment and while under my care.    X___________________________________________________ Date____________________    Certification From: 9/9/2024  To:12/8/2024      Date: 7/9/2024                TX#: 4/8 Date:7/11/2024                TX#: 5/8 Date:7/18/2024   Tx#: 6/8 Date:8/8/2024   Tx#: 7/8 Date:9/6/2024   Tx#: 8/8   Ther Ex: 42'  NuStep 8' Lv 3  Supine SB HS curls, x30  LTR stretch x20 B  HS stretch with strap, 2x30\" B  Piriformis stretch, 2x30\" B  Bridges, 2x10  Hooklying hip abd with Gray band, x30  Standing hip abd, ext with red cuff on ankles, 2x10 ea B  Shuttle leg press, 125#, 2x20  Shuttle single leg press, 62# x20 B  TRX squats, 1x20 Ther Ex: 40'  NuStep 5' Lv 3  Supine SB HS curls, x30  LTR stretch x20 B  HS stretch with strap, 2x30\" B  Piriformis stretch, 2x30\" B  SB Bridges, 2x10  S/L clams 2x10 B  Shuttle leg press, 125#, 3x15  Shuttle single leg press, 62# x20 B  TRX squats, 1x20  Side stepping, 2 laps, R tubing at ankles Ther Ex: 42'  NuStep 7' Lv 3  Supine SB HS curls, x30  LTR stretch x20 B  HS stretch with strap, 2x30\" B  Piriformis stretch, 2x30\" B  Standing calf stretch on 1/2 foam, 2x30\"B  Hooklying hip abd, Gray band, 3x10  S/L clams 2x10 B  Shuttle leg press, 125#, 3x15  Shuttle single leg press, 62# x30 B  TRX squats, 1x20  Standing hip abd, ext, YTB on ankles, 2x10 ea B   Ther Ex: 42'  NuStep 7' Lv 3  Supine SB HS curls, x30  LTR stretch x20 B  HS stretch with strap, 2x30\" B  Piriformis stretch, 2x30\" B  Supine march x20  Standing calf stretch on 1/2 foam, 2x30\"B  Hooklying hip  abd, Gray band, 3x10  S/L clams 2x10 B  Shuttle leg press, 125#, 2x20  Shuttle single leg press, 62# x25 B  Standing hip abd, ext, YTB on ankles, 2x10 ea B Ther Ex: 44'  NuStep 7' Lv 3  Supine SB HS curls, x30  HS stretch with strap, 2x30\" B  Piriformis stretch, 2x30\" B  Supine march x20  Standing calf stretch on 1/2 foam, 2x30\"B  Hooklying hip abd, Gray band, 3x10  Hooklying TA press with SB 20x5\"  Shuttle leg press, 125#, 3x10  Shuttle single leg press, 62# x20 B  Standing hip abd, YTB on ankles, 2x10 B   NMR: 5'  Tandem standing on Airex, 2x60\"  Step march on 6\" step, x20 B with min HHA NMR: 2'  Tandem standing on Airex, 2x60\"   NMR: 4'  SLS balance, 3x15\"  Slow march on Airex, 2x60\" NMR: 3'  SLS balance, 3x15\" on Airex   NMR: 2'  Slow march on Airex, 2x60\"                 HEP:  Access Code: [email or web address]  URL: [email or web address]  Date: 09/06/2024  Prepared by: Bk Phan    Exercises  - Hooklying Hamstring Stretch with Strap  - 1 x daily - 7 x weekly - 2 sets - 30 hold  - Supine Piriformis Stretch with Foot on Ground  - 1 x daily - 7 x weekly - 2 sets - 30 hold  - Supine Hip Adduction Isometric with Ball  - 1 x daily - 3 x weekly - 3 sets - 10 reps  - Hooklying Clamshell with Resistance  - 1 x daily - 3 x weekly - 3 sets - 10 reps  - Supine Lower Trunk Rotation  - 1 x daily - 7 x weekly - 2 sets - 10 reps  - Sidestepping  - 1 x daily - 3 x weekly - 2 sets  - Standing Tandem Balance with Counter Support  - 1 x daily - 3 x weekly - 2 sets - 60 hold  - Supine Hamstring Swiss Ball Curls/Dynamic Mobilization  - 1 x daily - 3 x weekly - 3 sets - 10 reps  - Gastroc Stretch on Step  - 1 x daily - 3 x weekly - 2 sets - 30 hold  - Standing March with Counter Support  - 1 x daily - 3 x weekly - 2 sets - 10 reps  - Supine Bridge  - 1 x daily - 3 x weekly - 2 sets - 10 reps  - Supine March  - 1 x daily - 3 x weekly - 2 sets - 10 reps  - Abdominal Press into Ball  - 1 x daily - 3 x weekly - 2 sets - 10  reps - 5 hold  - Standing Hip Abduction with Counter Support  - 1 x daily - 3 x weekly - 2 sets - 10 reps    Charges: 3 Ther ex        Total Timed Treatment: 46 min  Total Treatment Time: 46 min

## 2024-10-04 ENCOUNTER — OFFICE VISIT (OUTPATIENT)
Dept: ORTHOPEDICS CLINIC | Facility: CLINIC | Age: 69
End: 2024-10-04
Payer: COMMERCIAL

## 2024-10-04 VITALS — WEIGHT: 212.63 LBS | BODY MASS INDEX: 35.43 KG/M2 | HEIGHT: 65 IN

## 2024-10-04 DIAGNOSIS — M16.0 PRIMARY OSTEOARTHRITIS OF BOTH HIPS: Primary | ICD-10-CM

## 2024-10-04 DIAGNOSIS — G89.29 CHRONIC PAIN OF BOTH KNEES: ICD-10-CM

## 2024-10-04 DIAGNOSIS — M25.561 CHRONIC PAIN OF BOTH KNEES: ICD-10-CM

## 2024-10-04 DIAGNOSIS — M25.562 CHRONIC PAIN OF BOTH KNEES: ICD-10-CM

## 2024-10-04 RX ORDER — TIRZEPATIDE 7.5 MG/.5ML
7.5 INJECTION, SOLUTION SUBCUTANEOUS WEEKLY
COMMUNITY
Start: 2024-09-19

## 2024-10-04 RX ORDER — LEVOTHYROXINE SODIUM 100 UG/1
TABLET ORAL
COMMUNITY
Start: 2024-09-12

## 2024-10-04 RX ORDER — CLONIDINE HYDROCHLORIDE 0.1 MG/1
0.1 TABLET ORAL 2 TIMES DAILY
COMMUNITY
Start: 2024-08-01

## 2024-10-04 RX ORDER — HYDRALAZINE HYDROCHLORIDE 100 MG/1
1 TABLET, FILM COATED ORAL 3 TIMES DAILY
COMMUNITY
Start: 2024-08-01

## 2024-10-04 NOTE — H&P
[facility] New Patient Note    CC:   Chief Complaint   Patient presents with    Knee Pain     Bilateral knee pain        HPI: This 69 year old male presents today with complaints of bilateral knee pain, left worse than right.  He reports that this been ongoing for some years.  He notes pain around the front of the knees, denies radiation.  States it is worse with going up stairs, when he plants propels himself upward.  Also worse with prolonged weightbearing.  He denies radiation, but has been diagnosed with arthritis of both hips.  He reports the pain in the hips is more in the front, points near the groin.  He was recommended to start physical therapy, feels he is still having some discomfort limited due to the hips.  He denies swelling, popping or clicking around the knees.    Past Medical History:    Allergic rhinitis    Arrhythmia    Depression    Gout    High blood pressure    High cholesterol    Hypercholesterolemia    HYPERLIPIDEMIA    HYPERTENSION    Hypoactive thyroid    IBS (irritable bowel syndrome)    Kidney stone    Other and unspecified hyperlipidemia    Renal disorder    d/t sepsis    Rotator cuff tear    S/P repair    Seasonal allergies    SLEEP APNEA    Uvuloplasty and mask.  DMG SPLIT AHI 34 SaO2 pam 80 % CPAP 10 Prism    Sleep apnea    Tonsillitis    tonsillectomy    Type II or unspecified type diabetes mellitus without mention of complication, not stated as uncontrolled    Unspecified essential hypertension     Past Surgical History:   Procedure Laterality Date    Colonoscopy  04/25/2011    Dr. Almanzar - diverticulosis, repeat in 10yrs    Electrocardiogram, complete  10/22/13    scanned to media tab    Other surgical history  10/20/10    RIGHT SHOULDER SUBACROMIAL DECOMPRESSION WITH ROTATOR CUFF REPAIR    Removal gallbladder      Tonsillectomy       Current Outpatient Medications   Medication Sig Dispense Refill    hydrALAZINE 100 MG Oral Tab Take 1 tablet (100 mg total) by mouth 3 (three)  times daily.      levothyroxine 100 MCG Oral Tab       cloNIDine 0.1 MG Oral Tab Take 1 tablet (0.1 mg total) by mouth 2 (two) times daily.      MOUNJARO 7.5 MG/0.5ML Subcutaneous Solution Pen-injector Inject 7.5 mg into the skin once a week.      spironolactone 25 MG Oral Tab Take 1 tablet (25 mg total) by mouth daily. 1/2 tablet in the evening      omeprazole 20 MG Oral Capsule Delayed Release Take 1 capsule (20 mg total) by mouth daily as needed. 90 capsule 1    losartan 100 MG Oral Tab Take 1 tablet (100 mg total) by mouth daily. 90 tablet 3    escitalopram 10 MG Oral Tab Take 1 tablet (10 mg total) by mouth daily. 90 tablet 1    atorvastatin 40 MG Oral Tab Take 1 tablet (40 mg total) by mouth nightly. 90 tablet 3    allopurinol 300 MG Oral Tab Take 1 tablet (300 mg total) by mouth daily. 90 tablet 3    ELIQUIS 5 MG Oral Tab TAKE 1 TABLET(5 MG) BY MOUTH TWICE DAILY 180 tablet 3    GLIMEPIRIDE 2 MG Oral Tab TAKE 1 TABLET(2 MG) BY MOUTH TWICE DAILY (Patient taking differently: Take 1 tablet (2 mg total) by mouth in the morning and 1 tablet (2 mg total) before bedtime. 1 TABLET IN AM AND 2 IN PM.) 180 tablet 0    levothyroxine 75 MCG Oral Tab TAKE 1 TABLET BY MOUTH 30 MINUTES BEFORE BREAKFAST (Patient taking differently: Take 100 mcg by mouth before breakfast. TAKE 1 TABLET BY MOUTH 30 MINUTES BEFORE BREAKFAST) 90 tablet 0    Glucose Blood (ONETOUCH VERIO) In Vitro Strip USE AS DIRECTED ONCE DAILY 100 strip 1    METOPROLOL SUCCINATE 25 MG Oral Tablet 24 Hr TAKE 1 TABLET(25 MG) BY MOUTH DAILY (Patient taking differently: 2 tablets (50 mg total) daily.) 90 tablet 1    metFORMIN HCl  MG Oral Tablet 24 Hr Take 1 tablet in morning and 2 tablets in evening (Patient taking differently: Take 1 tablet (500 mg total) by mouth daily with breakfast. Take 1 tablet in morning and 2 tablets in evening) 270 tablet 1    valACYclovir 1 G Oral Tab 1 tablet po daily for 3-5 days prn 30 tablet 0    OneTouch Delica Lancets 33G  Does not apply Misc Use to test once daily 200 each 1    Probiotic Product (PROBIOTIC DAILY) Oral Cap Take by mouth daily.      Ferrous Sulfate 325 (65 Fe) MG Oral Tab Take 1 tablet (325 mg total) by mouth daily with breakfast. 30 tablet 2    Blood Glucose Monitoring Suppl (ONETOUCH BASIC SYSTEM) W/DEVICE Does not apply Kit To include test strips and lancets to test glucose three times daily #100 strips/lancets 1 kit 3    Vitamin B-12 (VITAMIN B12) 1000 MCG Oral Tab Take 1 tablet (1,000 mcg total) by mouth daily.      Semaglutide (RYBELSUS) 14 MG Oral Tab Take 14 mg by mouth daily.      amLODIPine Besylate 5 MG Oral Tab Take 1 tablet (5 mg total) by mouth 2 (two) times daily. (Patient not taking: Reported on 10/4/2024) 180 tablet 1     Allergies   Allergen Reactions    Dust Mites OTHER (SEE COMMENTS)     sneezing     Family History   Problem Relation Age of Onset    Heart Disorder Father     Hypertension Father         with kidney failure    Diabetes Father     Heart Disease Father         CAD    Renal Disease Father     Other (alcoholism[other]) Father     Other (Other[other]) Mother         Stroke    Stroke Mother 70        CVA    Stroke Maternal Grandmother         CVA    Cancer Maternal Grandfather      Social History     Occupational History    Occupation: .    Tobacco Use    Smoking status: Never    Smokeless tobacco: Never    Tobacco comments:     Updated 10/4/24   Vaping Use    Vaping status: Never Used   Substance and Sexual Activity    Alcohol use: Not Currently     Alcohol/week: 0.0 standard drinks of alcohol     Comment: very seldom    Drug use: No    Sexual activity: Yes     Partners: Female        ROS:  Detailed system review obtained and negative except as mentioned above      Physical Exam:    Ht 5' 5\" (1.651 m)   Wt 212 lb 9.6 oz (96.4 kg)   BMI 35.38 kg/m²   Constitutional: Awake, alert, no distress.  Present with spouse  Psychological: Appropriate affect.  Respiratory:  Unlabored breathing.  Bilateral lower extremity:  Inspection: skin intact, no effusion to either knee  Palpation: No tenderness about medial lateral joint line or patellofemoral joint  Range of motion: Full knee extension bilaterally, flexion past 1 10-1 20.  Passive hip range of motion with 40 external, 10 internal, no pain reproduction  Stinchfield negative.  Knee stable to varus and valgus stress.  Negative Panchito's  Neuromuscular: 5 out of 5 knee extension strength  Vascular: Warm well-perfused      Imaging: X-rays of both the right and left knees personally viewed, independently interpreted and radiology report read.  Demonstrates well-maintained joint space, no degenerative changes in the tibiofemoral space.  Mild osteophytic lipping of the lateral patellar facet.    X-ray of both hips from May 2024 unable to be viewed, however report is available through care everywhere.  [facility] read with moderate osteoarthritis of both hips      Labs: Hemoglobin A1c 5.7      Assessment/Plan:  Diagnoses and all orders for this visit:    Primary osteoarthritis of both hips    Chronic pain of both knees      Assessment: 69-year-old male with x-ray report of moderate bilateral hip osteoarthritis, complaint of bilateral knee pain    Plan: We discussed potential etiologies of his symptoms.  His pain is at the knee, however knee films and exam are largely unremarkable.  He does have diagnosis of moderate osteoarthritis of the hips, images are unable to be viewed but report viewable through care everywhere.  We did discuss possibility of referred pain from the hip.  My recommendation would be for further workup of this and consideration of image guided injection into the hip joint, both for therapeutic as well as potentially diagnostic purposes.  He expressed understanding with this.  I did provide guidance to get him set up with my partner Dr. Carlisle for evaluation and management.    Will Oviedo MD,  LUISAOS  [facility] Orthopaedic Surgery  Phone [phone]  Fax [phone]

## 2024-10-09 ENCOUNTER — APPOINTMENT (OUTPATIENT)
Dept: PHYSICAL THERAPY | Age: 69
End: 2024-10-09
Attending: STUDENT IN AN ORGANIZED HEALTH CARE EDUCATION/TRAINING PROGRAM
Payer: MEDICARE

## 2024-10-24 ENCOUNTER — OFFICE VISIT (OUTPATIENT)
Dept: ORTHOPEDICS CLINIC | Facility: CLINIC | Age: 69
End: 2024-10-24
Payer: COMMERCIAL

## 2024-10-24 VITALS — WEIGHT: 212 LBS | BODY MASS INDEX: 35.32 KG/M2 | HEIGHT: 65 IN

## 2024-10-24 DIAGNOSIS — M17.11 PRIMARY OSTEOARTHRITIS OF RIGHT KNEE: ICD-10-CM

## 2024-10-24 DIAGNOSIS — M16.11 PRIMARY OSTEOARTHRITIS OF RIGHT HIP: ICD-10-CM

## 2024-10-24 DIAGNOSIS — M16.12 PRIMARY OSTEOARTHRITIS OF LEFT HIP: Primary | ICD-10-CM

## 2024-10-24 DIAGNOSIS — M17.12 PRIMARY OSTEOARTHRITIS OF LEFT KNEE: ICD-10-CM

## 2024-10-24 RX ORDER — TRIAMCINOLONE ACETONIDE 40 MG/ML
40 INJECTION, SUSPENSION INTRA-ARTICULAR; INTRAMUSCULAR ONCE
Status: COMPLETED | OUTPATIENT
Start: 2024-10-24 | End: 2024-10-24

## 2024-10-24 RX ORDER — KETOROLAC TROMETHAMINE 30 MG/ML
30 INJECTION, SOLUTION INTRAMUSCULAR; INTRAVENOUS ONCE
Status: CANCELLED | OUTPATIENT
Start: 2024-10-24 | End: 2024-10-24

## 2024-10-24 RX ADMIN — TRIAMCINOLONE ACETONIDE 40 MG: 40 INJECTION, SUSPENSION INTRA-ARTICULAR; INTRAMUSCULAR at 13:19:00

## 2024-10-24 NOTE — H&P
Sports Medicine Clinic Note     Subjective:    Chief Complaint: Bilateral knee pain, left worse than right, possibly referred from the hips.    History: 69-year-old male presents for follow-up regarding bilateral knee pain, worse on the left side, with suspected referral to the knees. Pain is most noticeable in the front of the knees but has had groin/hip pain in the past with x-rays of the hips in May showing DJD. Pain is aggravated by activities such as stair climbing and prolonged standing. He denies knee swelling, popping, or clicking but experiences discomfort and limitations in function. Prior recommendation for physical therapy provided some relief, but persistent symptoms have led to consideration of intra-articular hip injections for both diagnostic and therapeutic purposes.    Objective:    Bilateral Hip Examination:    Inspection: Neutral hip alignment bilaterally, no erythema, swelling, or visible atrophy.  Palpation: Tenderness localized to the anterior aspect of both hips, near the groin. No palpable masses or abnormal warmth.  Range of Motion: Passive hip flexion to 110 degrees bilaterally. External rotation to 40 degrees and internal rotation to 10 degrees without significant pain.  Neurovascular: Strength intact with 5/5 hip flexion and extension bilaterally. Sensation intact to light touch over the anterior and lateral thighs. No gross motor deficits. Distal pulses 2+ bilaterally.  Special Tests: Negative Stinchfield test bilaterally. No reproduction of groin or knee pain with passive range of motion.    Diagnostic Tests:     X-rays of both the right and left knees personally viewed, independently interpreted and radiology report read.  Demonstrates well-maintained joint space, no degenerative changes in the tibiofemoral space.  Mild osteophytic lipping of the lateral patellar facets.    X-rays of the hips from May 2024 reveal moderate osteoarthritis bilaterally with joint space narrowing and  osteophytic changes. No acute fractures or dislocations noted. X-rays of the knees were unremarkable.    Assessment:    Moderate osteoarthritis of both hips.  Possible referred pain from hips to the knees.  Minimal patellofemoral DJD in the knees.    Plan:    Procedures: Proceed with bilateral hip injections today as planned. Administer intra-articular triamcinolone acetonide into each hip for therapeutic and diagnostic purposes.  Medications: Continue current medication regimen for pain control. Avoid NSAIDs as he is on blood thinners.  Therapy: Continue with home exercise program for hip mobility and strengthening. Physical therapy may be resumed after assessing response to injections.  Activity Recommendations: Limit weight-bearing activities to tolerance. Avoid stairs and prolonged standing until symptoms improve. Reassess activity level after injection effects are evaluated.    Follow-up: Tentative follow-up scheduled in 3-4 weeks to assess the response to the injections. Follow up sooner if symptoms worsen or fail to improve.    Ultrasound Guided Procedure Note:    After discussion of the risks and benefits, the patient elected to proceed with a therapeutic injection into the bilateral femoroacetabular space under US guidance. Confirmed that the patient does not have history of prior adverse reactions, active infections, or relevant allergies. There was no erythema or warmth, and the skin was clear.    For each side:    The skin was sterilized with ChloraPrep. A 22 gauge needle was inserted via inferolateral approach utilizing US for needle guidance and placement. The site was injected with a mixture of 1 mL of triamcinolone 40 mg/mL and 2 mL of 1% Lidocaine without Epinephrine. The injection was completed without complication, and a bandage was applied.    The patient tolerated the procedure well and was instructed to avoid strenuous activity for the next 24-48 hours and to use ice or Tylenol for pain as  needed. The patient will call immediately with any signs of infection or allergic reaction.    Post-Injection Care: The patient tolerated the procedure well. An occlusive bandage was placed over the injection site. Post-injection care instructions provided to the patient. The patient was asked to avoid strenuous activity and continue to rest the area for 2-3 days before resuming regular activities. Patient advised that the area may be more painful for the first 1-2 days. They can use ice or Tylenol for pain as needed.  Patient was instructed to watch for fever, increased swelling, or persistent pain. The patient will call immediately with any signs of infection or allergic reaction.    Complications: The patient tolerated the procedure well without any complications.      Gerson Carlisle DO, CAQSM   Primary Care Sports Medicine    Department of Orthopaedic Surgery  [facility]    [address] [place] [id number]  [address] [place] [address]    t: [phone]  f: [phone]      [email or web address]

## 2024-10-25 ENCOUNTER — APPOINTMENT (OUTPATIENT)
Dept: PHYSICAL THERAPY | Age: 69
End: 2024-10-25
Attending: STUDENT IN AN ORGANIZED HEALTH CARE EDUCATION/TRAINING PROGRAM
Payer: MEDICARE

## 2024-11-06 ENCOUNTER — APPOINTMENT (OUTPATIENT)
Dept: PHYSICAL THERAPY | Age: 69
End: 2024-11-06
Attending: STUDENT IN AN ORGANIZED HEALTH CARE EDUCATION/TRAINING PROGRAM
Payer: MEDICARE

## 2024-11-06 PROCEDURE — 97110 THERAPEUTIC EXERCISES: CPT | Performed by: PHYSICAL THERAPIST

## 2024-11-06 NOTE — PROGRESS NOTES
Diagnosis:   Primary osteoarthritis of right hip (M16.11)  Primary osteoarthritis of left hip (M16.12)      Referring Provider: CHI ALY  Date of Evaluation:    6/24/2024    Precautions:  None Next MD visit:   none scheduled  Date of Surgery: n/a   Insurance Primary/Secondary: [employer] MEDICARE / N/A     # Auth Visits: 16        Expires 12/26    Subjective:  Patient states he had MD follow up regarding persistent hip and knee pain. Received injections to B hips with decreased pain in hips and knees. Occasional LBP persists.    Pain:  0/10 B hips and knees      Objective:  Ambulating with normal gait mechanics      Assessment: Patient tolerated LE strengthening activities well without reported B hip or knee pain. Maintaining good LE mechanics during CKC ex's. Mild LBP with transition up from supine position, but symptoms resolved quickly.       Goals:   (to be met in 8 visits)  Pt will have improved hip AROM Flex to 110 deg and ABD to 40 deg to be able to don/doff shoes and perform car transfers without difficulty   Pt will improve hip ABD and ER strength to 4+/5 to increase ease with standing and walking   Pt will be able to squat to  light objects around the house with <2/10 hip pain   Pt will improve functional hip strength to report ability to ascend/descend 1 flight of stairs reciprocally without use of handrail   Pt will demonstrate improved SLS to >30 seconds SAMRA to promote safety and decrease risk of falls on uneven surfaces such as grass and gravel   Pt will be independent and compliant with comprehensive HEP to maintain progress achieved in PT        Plan: Continue to progress with lumbar stabilization and LE strengthening/balance activities as tolerated.    Certification From: 9/9/2024  To:12/8/2024      Date:7/11/2024                TX#: 5/8 Date:7/18/2024   Tx#: 6/8 Date:8/8/2024   Tx#: 7/8 Date:9/6/2024   Tx#: 8/8 Date:11/6/2024   Tx#: 9/16   Ther Ex: 40'  NuStep 5' Lv  3  Supine SB HS curls, x30  LTR stretch x20 B  HS stretch with strap, 2x30\" B  Piriformis stretch, 2x30\" B  SB Bridges, 2x10  S/L clams 2x10 B  Shuttle leg press, 125#, 3x15  Shuttle single leg press, 62# x20 B  TRX squats, 1x20  Side stepping, 2 laps, R tubing at ankles Ther Ex: 42' NuStep 7' Lv 3  Supine SB HS curls, x30  LTR stretch x20 B  HS stretch with strap, 2x30\" B  Piriformis stretch, 2x30\" B  Standing calf stretch on 1/2 foam, 2x30\"B  Hooklying hip abd, Gray band, 3x10  S/L clams 2x10 B  Shuttle leg press, 125#, 3x15  Shuttle single leg press, 62# x30 B  TRX squats, 1x20  Standing hip abd, ext, YTB on ankles, 2x10 ea B   Ther Ex: 42' NuStep 7' Lv 3  Supine SB HS curls, x30  LTR stretch x20 B  HS stretch with strap, 2x30\" B  Piriformis stretch, 2x30\" B  Supine march x20  Standing calf stretch on 1/2 foam, 2x30\"B  Hooklying hip abd, Gray band, 3x10  S/L clams 2x10 B  Shuttle leg press, 125#, 2x20  Shuttle single leg press, 62# x25 B  Standing hip abd, ext, YTB on ankles, 2x10 ea B Ther Ex: 44' NuStep 7' Lv 3  Supine SB HS curls, x30  HS stretch with strap, 2x30\" B  Piriformis stretch, 2x30\" B  Supine march x20  Standing calf stretch on 1/2 foam, 2x30\"B  Hooklying hip abd, Gray band, 3x10  Hooklying TA press with SB 20x5\"  Shuttle leg press, 125#, 3x10  Shuttle single leg press, 62# x20 B  Standing hip abd, YTB on ankles, 2x10 B Ther Ex: 42' NuStep6' Lv 3  Supine SB HS curls, x30  Supine march x20  Standing calf stretch on 1/2 foam, 2x30\"B  Hooklying hip abd, green pilates ring, 3x10  Hooklying TA press with SB 20x5\"  Shuttle leg press, 125#, 3x10  Standing hip abd, YTB on ankles, 2x10 B  Step ups with march, 4\" step x10B   NMR: 2'  Tandem standing on Airex, 2x60\"   NMR: 4'  SLS balance, 3x15\"  Slow march on Airex, 2x60\" NMR: 3'  SLS balance, 3x15\" on Airex   NMR: 2'  Slow march on Airex, 2x60\" NMR: 2'  Rocker board balance AP/ML 60\" ea                 HEP:  Access Code: [email or web address]  URL:  [email or web address]  Date: 09/06/2024  Prepared by: Bk Phan    Exercises  - Hooklying Hamstring Stretch with Strap  - 1 x daily - 7 x weekly - 2 sets - 30 hold  - Supine Piriformis Stretch with Foot on Ground  - 1 x daily - 7 x weekly - 2 sets - 30 hold  - Supine Hip Adduction Isometric with Ball  - 1 x daily - 3 x weekly - 3 sets - 10 reps  - Hooklying Clamshell with Resistance  - 1 x daily - 3 x weekly - 3 sets - 10 reps  - Supine Lower Trunk Rotation  - 1 x daily - 7 x weekly - 2 sets - 10 reps  - Sidestepping  - 1 x daily - 3 x weekly - 2 sets  - Standing Tandem Balance with Counter Support  - 1 x daily - 3 x weekly - 2 sets - 60 hold  - Supine Hamstring Swiss Ball Curls/Dynamic Mobilization  - 1 x daily - 3 x weekly - 3 sets - 10 reps  - Gastroc Stretch on Step  - 1 x daily - 3 x weekly - 2 sets - 30 hold  - Standing March with Counter Support  - 1 x daily - 3 x weekly - 2 sets - 10 reps  - Supine Bridge  - 1 x daily - 3 x weekly - 2 sets - 10 reps  - Supine March  - 1 x daily - 3 x weekly - 2 sets - 10 reps  - Abdominal Press into Ball  - 1 x daily - 3 x weekly - 2 sets - 10 reps - 5 hold  - Standing Hip Abduction with Counter Support  - 1 x daily - 3 x weekly - 2 sets - 10 reps    Charges: 3 Ther ex        Total Timed Treatment: 44 min  Total Treatment Time: 44 min

## 2024-11-13 ENCOUNTER — TELEPHONE (OUTPATIENT)
Facility: CLINIC | Age: 69
End: 2024-11-13

## 2024-11-13 DIAGNOSIS — M25.552 BILATERAL HIP PAIN: Primary | ICD-10-CM

## 2024-11-13 DIAGNOSIS — M25.551 BILATERAL HIP PAIN: Primary | ICD-10-CM

## 2024-11-13 NOTE — TELEPHONE ENCOUNTER
Xray ordered per Ortho protocol, Xray scheduled.  [employer] message sent to patient to arrive 15 - 20 min early to complete imaging.

## 2024-11-14 ENCOUNTER — OFFICE VISIT (OUTPATIENT)
Dept: ORTHOPEDICS CLINIC | Facility: CLINIC | Age: 69
End: 2024-11-14
Payer: COMMERCIAL

## 2024-11-14 VITALS — HEIGHT: 65 IN | BODY MASS INDEX: 35.32 KG/M2 | WEIGHT: 212 LBS

## 2024-11-14 DIAGNOSIS — M16.12 PRIMARY OSTEOARTHRITIS OF LEFT HIP: Primary | ICD-10-CM

## 2024-11-14 DIAGNOSIS — M16.11 PRIMARY OSTEOARTHRITIS OF RIGHT HIP: ICD-10-CM

## 2024-11-14 PROCEDURE — 99214 OFFICE O/P EST MOD 30 MIN: CPT | Performed by: FAMILY MEDICINE

## 2024-11-14 PROCEDURE — 1159F MED LIST DOCD IN RCRD: CPT | Performed by: FAMILY MEDICINE

## 2024-11-14 PROCEDURE — 3008F BODY MASS INDEX DOCD: CPT | Performed by: FAMILY MEDICINE

## 2024-11-14 PROCEDURE — 1126F AMNT PAIN NOTED NONE PRSNT: CPT | Performed by: FAMILY MEDICINE

## 2024-11-14 PROCEDURE — 1160F RVW MEDS BY RX/DR IN RCRD: CPT | Performed by: FAMILY MEDICINE

## 2024-11-14 NOTE — PROGRESS NOTES
Sports Medicine Clinic Note    Subjective:    Chief Complaint: Resolution of knee pain and hip pain.    History: 69-year-old male presents for follow-up after previous bilateral hip injections for moderate osteoarthritis. He reports resolution of both knee and hip pain since the last visit. He no longer experiences knee discomfort, which was suspected to be referred pain from his hips. Denies any new pain, swelling, or functional limitations. The patient is satisfied with his symptom improvement and is overall doing well.    Objective:    Bilateral Hip Examination:    Inspection: Neutral alignment bilaterally; no visible atrophy, swelling, or erythema.  Palpation: No tenderness over the anterior hips or groin bilaterally.  Range of Motion: Passive hip flexion to 110 degrees bilaterally, external rotation to 40 degrees, internal rotation to 10 degrees without pain.  Neurovascular: Strength is 5/5 in hip flexion and extension bilaterally. Sensation remains intact over anterior and lateral thighs. Distal pulses are 2+ bilaterally.  Special Tests: Negative Stinchfield test bilaterally; no pain reproduced in the groin or knee with passive range of motion.    Diagnostic Tests:     No additional imaging studies conducted during this visit. Prior X-rays of hips (May 2024) showed moderate osteoarthritis, and knee X-rays were unremarkable aside from mild patellofemoral degenerative changes.    Assessment:    Moderate osteoarthritis of both hips, successfully managed with intra-articular injections.  Resolved knee pain, likely referred from hip osteoarthritis.    Plan:    Therapy: Strongly encouraged initiation of physical therapy focused on hip strength and mobility to maintain joint function and prevent recurrence.  Lifestyle Modification: Emphasized importance of weight loss and regular low-impact exercise to reduce joint stress and manage symptoms long-term.  Medications: Continue current home pain management regimen as  needed.  Activity Recommendations: Advised to continue low-impact activities, avoiding high-impact exercises that may stress the hip joints.    Follow-Up: The patient will follow up as needed if symptoms recur or worsen. No routine follow-up scheduled unless clinically indicated.      Gerson Carlisle DO, CAQSM   Primary [facility] Sports Medicine

## 2024-11-18 ENCOUNTER — OFFICE VISIT (OUTPATIENT)
Dept: PHYSICAL THERAPY | Age: 69
End: 2024-11-18
Attending: STUDENT IN AN ORGANIZED HEALTH CARE EDUCATION/TRAINING PROGRAM
Payer: MEDICARE

## 2024-11-18 PROCEDURE — 97110 THERAPEUTIC EXERCISES: CPT | Performed by: PHYSICAL THERAPIST

## 2024-11-18 NOTE — PROGRESS NOTES
Diagnosis:   Primary osteoarthritis of right hip (M16.11)  Primary osteoarthritis of left hip (M16.12)      Referring Provider: CHI ALY  Date of Evaluation:    6/24/2024    Precautions:  None Next MD visit:   none scheduled  Date of Surgery: n/a   Insurance Primary/Secondary: [employer] MEDICARE / N/A     # Auth Visits: 16        Expires 12/26    Subjective:  Patient states his hips continue to feel much better. Went to the gym yesterday and walked laps around the track with good tolerance.    Pain:  0/10 B hips and knees      Objective:  B hip PROM flexion 110 deg without ERP      Assessment: Patient was challenged with strengthening and balance activities but able to complete without increased hip or knee pain.  Increased B hip ROM with decreased ERP.  Improving LE flexibility.      Goals:   (to be met in 8 visits)  Pt will have improved hip AROM Flex to 110 deg and ABD to 40 deg to be able to don/doff shoes and perform car transfers without difficulty   Pt will improve hip ABD and ER strength to 4+/5 to increase ease with standing and walking   Pt will be able to squat to  light objects around the house with <2/10 hip pain   Pt will improve functional hip strength to report ability to ascend/descend 1 flight of stairs reciprocally without use of handrail   Pt will demonstrate improved SLS to >30 seconds SAMRA to promote safety and decrease risk of falls on uneven surfaces such as grass and gravel   Pt will be independent and compliant with comprehensive HEP to maintain progress achieved in PT        Plan: Continue to progress with lumbar stabilization and LE strengthening/balance activities as tolerated.    Certification From: 9/9/2024  To:12/8/2024      Date:7/11/2024                TX#: 5/8 Date:7/18/2024   Tx#: 6/8 Date:8/8/2024   Tx#: 7/8 Date:9/6/2024   Tx#: 8/8 Date:11/6/2024   Tx#: 9/16 Date:11/18/2024   Tx#: 10/16   Ther Ex: 40'  NuStep 5' Lv 3  Supine SB HS curls, x30  LTR stretch  x20 B  HS stretch with strap, 2x30\" B  Piriformis stretch, 2x30\" B  SB Bridges, 2x10  S/L clams 2x10 B  Shuttle leg press, 125#, 3x15  Shuttle single leg press, 62# x20 B  TRX squats, 1x20  Side stepping, 2 laps, R tubing at ankles Ther Ex: 42' NuStep 7' Lv 3  Supine SB HS curls, x30  LTR stretch x20 B  HS stretch with strap, 2x30\" B  Piriformis stretch, 2x30\" B  Standing calf stretch on 1/2 foam, 2x30\"B  Hooklying hip abd, Gray band, 3x10  S/L clams 2x10 B  Shuttle leg press, 125#, 3x15  Shuttle single leg press, 62# x30 B  TRX squats, 1x20  Standing hip abd, ext, YTB on ankles, 2x10 ea B   Ther Ex: 42' NuStep 7' Lv 3  Supine SB HS curls, x30  LTR stretch x20 B  HS stretch with strap, 2x30\" B  Piriformis stretch, 2x30\" B  Supine march x20  Standing calf stretch on 1/2 foam, 2x30\"B  Hooklying hip abd, Gray band, 3x10  S/L clams 2x10 B  Shuttle leg press, 125#, 2x20  Shuttle single leg press, 62# x25 B  Standing hip abd, ext, YTB on ankles, 2x10 ea B Ther Ex: 44' NuStep 7' Lv 3  Supine SB HS curls, x30  HS stretch with strap, 2x30\" B  Piriformis stretch, 2x30\" B  Supine march x20  Standing calf stretch on 1/2 foam, 2x30\"B  Hooklying hip abd, Gray band, 3x10  Hooklying TA press with SB 20x5\"  Shuttle leg press, 125#, 3x10  Shuttle single leg press, 62# x20 B  Standing hip abd, YTB on ankles, 2x10 B Ther Ex: 42' NuStep 6' Lv 3  Supine SB HS curls, x30  Supine march x20  Standing calf stretch on 1/2 foam, 2x30\"B  Hooklying hip abd, green pilates ring, 3x10  Hooklying TA press with SB 20x5\"  Shuttle leg press, 125#, 3x10  Standing hip abd, YTB on ankles, 2x10 B  Step ups with march, 4\" step x10B Ther Ex: 43'  NuStep 6' Lv 3  HS stretch 2x20\" B  Piriformis stretch 2x20\" B  Supine SB HS curls, x30  Standing calf stretch on step 2x20\"B  Hooklying hip abd, Black band, 2x10  Hooklying TA press with SB 20x5\"  Shuttle leg press, 125#, 3x10  Shuttle SL leg press 62# x20 B  Side stepping, YTB on ankles, 2 laps  Step ups with  march, 4\" step x10 B   NMR: 2'  Tandem standing on Airex, 2x60\"   NMR: 4'  SLS balance, 3x15\"  Slow march on Airex, 2x60\" NMR: 3'  SLS balance, 3x15\" on Airex   NMR: 2'  Slow march on Airex, 2x60\" NMR: 2'  Rocker board balance AP/ML 60\" ea NMR: 2'  Rocker board balance AP/ML 60\" ea                   HEP:  Access Code: [email or web address]  URL: [email or web address]  Date: 09/06/2024  Prepared by: Bk Phan    Exercises  - Hooklying Hamstring Stretch with Strap  - 1 x daily - 7 x weekly - 2 sets - 30 hold  - Supine Piriformis Stretch with Foot on Ground  - 1 x daily - 7 x weekly - 2 sets - 30 hold  - Supine Hip Adduction Isometric with Ball  - 1 x daily - 3 x weekly - 3 sets - 10 reps  - Hooklying Clamshell with Resistance  - 1 x daily - 3 x weekly - 3 sets - 10 reps  - Supine Lower Trunk Rotation  - 1 x daily - 7 x weekly - 2 sets - 10 reps  - Sidestepping  - 1 x daily - 3 x weekly - 2 sets  - Standing Tandem Balance with Counter Support  - 1 x daily - 3 x weekly - 2 sets - 60 hold  - Supine Hamstring Swiss Ball Curls/Dynamic Mobilization  - 1 x daily - 3 x weekly - 3 sets - 10 reps  - Gastroc Stretch on Step  - 1 x daily - 3 x weekly - 2 sets - 30 hold  - Standing March with Counter Support  - 1 x daily - 3 x weekly - 2 sets - 10 reps  - Supine Bridge  - 1 x daily - 3 x weekly - 2 sets - 10 reps  - Supine March  - 1 x daily - 3 x weekly - 2 sets - 10 reps  - Abdominal Press into Ball  - 1 x daily - 3 x weekly - 2 sets - 10 reps - 5 hold  - Standing Hip Abduction with Counter Support  - 1 x daily - 3 x weekly - 2 sets - 10 reps    Charges: 3 Ther ex        Total Timed Treatment: 45 min  Total Treatment Time: 45 min

## 2024-12-03 ENCOUNTER — OFFICE VISIT (OUTPATIENT)
Dept: PHYSICAL THERAPY | Age: 69
End: 2024-12-03
Attending: STUDENT IN AN ORGANIZED HEALTH CARE EDUCATION/TRAINING PROGRAM
Payer: MEDICARE

## 2024-12-03 PROCEDURE — 97110 THERAPEUTIC EXERCISES: CPT | Performed by: PHYSICAL THERAPIST

## 2024-12-03 NOTE — PROGRESS NOTES
Discharge Summary  Pt has attended 11 visits in Physical Therapy.       Diagnosis:   Primary osteoarthritis of right hip (M16.11)  Primary osteoarthritis of left hip (M16.12)      Referring Provider: CHI ALY  Date of Evaluation:    6/24/2024    Precautions:  None Next MD visit:   none scheduled  Date of Surgery: n/a   Insurance Primary/Secondary: [employer] MEDICARE / N/A     # Auth Visits: 16        Expires 12/26    Subjective:  Patient states his hips continue to feel good with no pain and improving mobility. Performing HEP with good tolerance.  Patient reports good tolerance to ascending/descending stairs and prolonged walking without increased symptoms.    Pain:  0/10 B hips and knees      Objective:    Observation: Ambulating with normal gait mechanics  Palpation: Primarily non tender B hips        AROM: (* denotes performed with pain)  Hip   Flexion: R 120; L 120  Abduction: R 35; L 35  ER: R 50; L 60  IR: R 35; L 20      Accessory motion: Minimal joint restrictions B hips     Flexibility:  Hip Flexor: R Mod restrictions, L Mod restrictions  Hamstrings: R 70 deg with SLR; L 70 deg with SLR  Piriformis: R mild restrictions; L Mild restrictions        Strength/MMT: (* denotes performed with pain)  Hip Knee Foot/Ankle   Flexion: R 5/5; L 5/5  Abduction: R 4+/5; L 5/5  ER: R 5/5; L 5/5  IR: R 5/5; L 5/5 Flexion: R 5/5; L 5/5  Extension: R 5/5; L 5/5    DF: R 5/5; L 5/5  PF: R 5/5; L 5/5               Gait: pt ambulates on level ground with normal gait  Balance: SLS: R >30 sec, L >30 sec      Assessment: Patient has complete 11 visits of skilled PT for a diagnosis of B hip OA. Patient has made good progress with PT since SOC, currently reports 0/10 pain in B hips. Patient has made objective improvements that include increased ROM, increased strength, decreased soft tissue and joint restrictions, improve balance and gait mechanics. Functional improvements include donning/doffing shoes and socks,  transfers in/out of car, prolonged standing and walking, squatting, ascending/descending stairs, and walking on uneven surfaces. Patient is independent with his HEP with handouts provided. Patient will DC from PT at this time.      Goals:   (to be met in 8 visits)  Pt will have improved hip AROM Flex to 110 deg and ABD to 40 deg to be able to don/doff shoes and perform car transfers without difficulty -Met  Pt will improve hip ABD and ER strength to 4+/5 to increase ease with standing and walking -Met  Pt will be able to squat to  light objects around the house with <2/10 hip pain -Met  Pt will improve functional hip strength to report ability to ascend/descend 1 flight of stairs reciprocally without use of handrail -Met  Pt will demonstrate improved SLS to >30 seconds SAMRA to promote safety and decrease risk of falls on uneven surfaces such as grass and gravel -Met  Pt will be independent and compliant with comprehensive HEP to maintain progress achieved in PT  -Met      Plan:  Patient will DC from PT and continue independently with his HEP.    Patient/Family/Caregiver was advised of these findings, precautions, and treatment options and has agreed to actively participate in planning and for this course of care.    Thank you for your referral. If you have any questions, please contact me at Dept: [phone].    Sincerely,  Electronically signed by therapist: Bk Phan, PT     Physician's certification required:  Yes  Please co-sign or sign and return this letter via fax as soon as possible to [phone].   I certify the need for these services furnished under this plan of treatment and while under my care.    X___________________________________________________ Date____________________    Certification From: 12/3/2024  To:3/3/2025       Date:7/18/2024   Tx#: 6/8 Date:8/8/2024   Tx#: 7/8 Date:9/6/2024   Tx#: 8/8 Date:11/6/2024   Tx#: 9/16 Date:11/18/2024   Tx#: 10/16 Date:12/3/2024   Tx#: 11/16    Ther Ex: 42' NuStep 7' Lv 3  Supine SB HS curls, x30  LTR stretch x20 B  HS stretch with strap, 2x30\" B  Piriformis stretch, 2x30\" B  Standing calf stretch on 1/2 foam, 2x30\"B  Hooklying hip abd, Gray band, 3x10  S/L clams 2x10 B  Shuttle leg press, 125#, 3x15  Shuttle single leg press, 62# x30 B  TRX squats, 1x20  Standing hip abd, ext, YTB on ankles, 2x10 ea B   Ther Ex: 42' NuStep 7' Lv 3  Supine SB HS curls, x30  LTR stretch x20 B  HS stretch with strap, 2x30\" B  Piriformis stretch, 2x30\" B  Supine march x20  Standing calf stretch on 1/2 foam, 2x30\"B  Hooklying hip abd, Gray band, 3x10  S/L clams 2x10 B  Shuttle leg press, 125#, 2x20  Shuttle single leg press, 62# x25 B  Standing hip abd, ext, YTB on ankles, 2x10 ea B Ther Ex: 44' NuStep 7' Lv 3  Supine SB HS curls, x30  HS stretch with strap, 2x30\" B  Piriformis stretch, 2x30\" B  Supine march x20  Standing calf stretch on 1/2 foam, 2x30\"B  Hooklying hip abd, Gray band, 3x10  Hooklying TA press with SB 20x5\"  Shuttle leg press, 125#, 3x10  Shuttle single leg press, 62# x20 B  Standing hip abd, YTB on ankles, 2x10 B Ther Ex: 42' NuStep 6' Lv 3  Supine SB HS curls, x30  Supine march x20  Standing calf stretch on 1/2 foam, 2x30\"B  Hooklying hip abd, green pilates ring, 3x10  Hooklying TA press with SB 20x5\"  Shuttle leg press, 125#, 3x10  Standing hip abd, YTB on ankles, 2x10 B  Step ups with march, 4\" step x10B Ther Ex: 43' NuStep 6' Lv 3  HS stretch 2x20\" B  Piriformis stretch 2x20\" B  Supine SB HS curls, x30  Standing calf stretch on step 2x20\"B  Hooklying hip abd, Black band, 2x10  Hooklying TA press with SB 20x5\"  Shuttle leg press, 125#, 3x10  Shuttle SL leg press 62# x20 B  Side stepping, YTB on ankles, 2 laps  Step ups with march, 4\" step x10 B Ther Ex: 39'  NuStep 6' Lv 3  HS stretch 2x20\" B  Piriformis stretch 2x20\" B  Supine SB HS curls, x30  Standing calf stretch on step 2x20\"B  Hooklying hip abd, Black band, 2x10  Hooklying TA press with SB  20x5\"  Shuttle leg press, 125#, 3x10  Shuttle SL leg press 62# x20 B  Side stepping, YTB on ankles, 2 laps  Step ups with march, 4\" step x10 B  Reassessment  HEP review   NMR: 4'  SLS balance, 3x15\"  Slow march on Airex, 2x60\" NMR: 3'  SLS balance, 3x15\" on Airex   NMR: 2'  Slow march on Airex, 2x60\" NMR: 2'  Rocker board balance AP/ML 60\" ea NMR: 2'  Rocker board balance AP/ML 60\" ea NMR: 4'  SLS balance with 3 way taps x10 B                   HEP:  Access Code: [email or web address]  URL: [email or web address]  Date: 12/03/2024  Prepared by: Bk Phan    Exercises  - Hooklying Hamstring Stretch with Strap  - 1 x daily - 7 x weekly - 2 sets - 30 hold  - Supine Piriformis Stretch with Foot on Ground  - 1 x daily - 7 x weekly - 2 sets - 30 hold  - Supine Hip Adduction Isometric with Ball  - 1 x daily - 3 x weekly - 3 sets - 10 reps  - Hooklying Clamshell with Resistance  - 1 x daily - 3 x weekly - 3 sets - 10 reps  - Supine Lower Trunk Rotation  - 1 x daily - 7 x weekly - 2 sets - 10 reps  - Sidestepping  - 1 x daily - 3 x weekly - 2 sets  - Standing Tandem Balance with Counter Support  - 1 x daily - 3 x weekly - 2 sets - 60 hold  - Supine Hamstring Swiss Ball Curls/Dynamic Mobilization  - 1 x daily - 3 x weekly - 3 sets - 10 reps  - Gastroc Stretch on Step  - 1 x daily - 3 x weekly - 2 sets - 30 hold  - Standing March with Counter Support  - 1 x daily - 3 x weekly - 2 sets - 10 reps  - Supine Bridge  - 1 x daily - 3 x weekly - 2 sets - 10 reps  - Supine March  - 1 x daily - 3 x weekly - 2 sets - 10 reps  - Abdominal Press into Ball  - 1 x daily - 3 x weekly - 2 sets - 10 reps - 5 hold  - Standing Hip Abduction with Counter Support  - 1 x daily - 3 x weekly - 2 sets - 10 reps  - Hip Adductors and Hamstring Stretch with Strap  - 1 x daily - 3 x weekly - 2 sets - 30 hold    Charges: 3 Ther ex        Total Timed Treatment: 43 min  Total Treatment Time: 43 min

## (undated) NOTE — LETTER
Patient Name: John Romero        : 1955       Medical Record #: [id number]    CONSENT FOR PROCEDURES/SEDATION    Date: 10/24/2024       Time: 1:19 PM        1. I authorize the performance upon John Romero the following:    BILATERAL HIP WITH GUIDED ULTRASOUND INJECTIONS  __________________________________________________________________________    2. I authorize Dr. CASH (and whomever is designated as the doctor’s assistant), to perform the above mentioned procedures.    3. If any unforeseen conditions arise during this procedure calling for additional procedures, operations, or medications (including anesthesia and blood transfusion), I  further request and authorize the doctor to do whatever he/she deems advisable in my interest.    4. I consent to the taking and reproduction of any photographs in the course of this procedure for professional purposes.    5. I consent to the administration of such sedation as may be considered necessary or advisable by the physician responsible for this service, with the exception of  _____________________________.    6. I have been informed by my doctor of the nature and purpose of this procedure/sedation, possible alternative methods of treatment, risk involved and possible complications.      Signature of Patient:___________________________  DATE: 10/24/24    Signature of person authorized to consent for patient: Relationship to patient:  ___________________________    ___________________    Witness: ____________________     Date: 10/24/24    Provider: ____________________     Date: 10/24/24

## (undated) NOTE — IP AVS SNAPSHOT
[facility] [place] [address] [place], [address] ~ [phone]                Discharge Summary   6/15/2017    [address] [facility]           Admission Information        Provider Department    6/15/2017 Mishel Brown MD  [id number] Commonly known as:  ZYLOPRIM        TAKE 1 TABLET DAILY    An Cat        9am                   AmLODIPine Besylate 5 MG Tabs   Last time this was given:  10 mg on 6/21/2017  8:48 AM   Commonly known as:  NORVASC        TAKE 1 TABLET TWICE Take  by mouth. 9am                   Vitamin D3 2000 units Caps   Commonly known as:  VITAMIN D3        Take  by mouth.          9am                     STOP taking these medications     Fenofibrate 145 MG Tabs   Commonly known as:  TRICOR Follow up with Josephine Oreilly MD In 2 weeks.     Specialty:  NEPHROLOGY    Contact information:    [address] [facility] [phone]          Follow up with Blanca Arshad MD.    Specialties:  INFECTIOUS DISEASES, Internal Medicin Neutrophil % Lymphocyte % Monocyte % Eosinophil % Basophil % Prelim Neut Abs Final Neut Abs Lymphocyte Abso Monocyte Absolu Eosinophil Abso Basophil Absolu    (06/19/17)  70.1 (06/19/17)  14.3 (06/19/17)  10.6 (06/19/17)  1.9 (06/19/17)  0.5 -- (06/19/17) Patient [address] to help you get signed up for insurance coverage. Patient [address] is a Federal Navigator program that can help with your Affordable Care Act coverage, as well as all types of Medicaid plans.   To get signed up and covere Blood Pressure and Cardiac Medications     Metoprolol Succinate ER 25 MG Oral Tablet 24 Hr    AMLODIPINE BESYLATE 5 MG Oral Tab         Use: Treat abnormal blood pressure (high or low), cardiac conditions; and/or abnormal heart rates/rhythms   Most c What to report to your healthcare team: Pain, nausea/vomiting, no bowel movement in 2+ days, diarrhea           Endocrine Medications     LEVOTHYROXINE SODIUM 75 MCG Oral Tab         Use: Regulate metabolism and inflammation   Most common side effects:  Jose Urbina

## (undated) NOTE — ED AVS SNAPSHOT
Deny Ornelas   MRN: [id number]    Department:  [facility] Emergency Department   Date of Visit:  12/3/2017           Disclosure     Insurance plans vary and the physician(s) referred by the ER may not be covered by your plan.  Please contact your tell this physician (or your personal doctor if your instructions are to return to your personal doctor) about any new or lasting problems. The primary care or specialist physician will see patients referred from the [facility] Emergency Department.  Wing Gay

## (undated) NOTE — ED AVS SNAPSHOT
Moose Nieves   MRN: [id number]    Department:  [facility] Emergency Department   Date of Visit:  3/4/2019           Disclosure     Insurance plans vary and the physician(s) referred by the ER may not be covered by your plan.  Please contact your tell this physician (or your personal doctor if your instructions are to return to your personal doctor) about any new or lasting problems. The primary care or specialist physician will see patients referred from the [facility] Emergency Department.  Grisel ePrsaud